# Patient Record
Sex: FEMALE | Race: WHITE | Employment: OTHER | URBAN - METROPOLITAN AREA
[De-identification: names, ages, dates, MRNs, and addresses within clinical notes are randomized per-mention and may not be internally consistent; named-entity substitution may affect disease eponyms.]

---

## 2017-05-11 ENCOUNTER — TELEPHONE (OUTPATIENT)
Dept: FAMILY MEDICINE CLINIC | Age: 77
End: 2017-05-11

## 2017-05-11 DIAGNOSIS — R73.01 IFG (IMPAIRED FASTING GLUCOSE): Primary | ICD-10-CM

## 2017-06-01 ENCOUNTER — OFFICE VISIT (OUTPATIENT)
Dept: FAMILY MEDICINE CLINIC | Age: 77
End: 2017-06-01

## 2017-06-01 VITALS
DIASTOLIC BLOOD PRESSURE: 72 MMHG | WEIGHT: 190 LBS | BODY MASS INDEX: 33.66 KG/M2 | SYSTOLIC BLOOD PRESSURE: 120 MMHG | HEART RATE: 68 BPM | RESPIRATION RATE: 16 BRPM | TEMPERATURE: 97.6 F

## 2017-06-01 DIAGNOSIS — M85.80 OSTEOPENIA: ICD-10-CM

## 2017-06-01 DIAGNOSIS — R73.01 IFG (IMPAIRED FASTING GLUCOSE): Primary | ICD-10-CM

## 2017-06-01 DIAGNOSIS — I65.21 STENOSIS OF RIGHT CAROTID ARTERY: ICD-10-CM

## 2017-06-01 DIAGNOSIS — I10 ESSENTIAL HYPERTENSION: ICD-10-CM

## 2017-06-01 DIAGNOSIS — E78.5 HYPERLIPIDEMIA, UNSPECIFIED HYPERLIPIDEMIA TYPE: ICD-10-CM

## 2017-06-01 PROCEDURE — 99214 OFFICE O/P EST MOD 30 MIN: CPT | Performed by: FAMILY MEDICINE

## 2017-06-01 RX ORDER — ROSUVASTATIN CALCIUM 20 MG/1
20 TABLET, COATED ORAL NIGHTLY
Qty: 90 TABLET | Refills: 3 | Status: SHIPPED | OUTPATIENT
Start: 2017-06-01 | End: 2018-06-11 | Stop reason: SDUPTHER

## 2017-06-01 RX ORDER — METOPROLOL TARTRATE AND HYDROCHLOROTHIAZIDE 50; 25 MG/1; MG/1
1 TABLET ORAL 2 TIMES DAILY
COMMUNITY
Start: 2017-03-18 | End: 2017-06-01 | Stop reason: SDUPTHER

## 2017-06-01 RX ORDER — AMLODIPINE BESYLATE AND BENAZEPRIL HYDROCHLORIDE 5; 20 MG/1; MG/1
CAPSULE ORAL
Qty: 180 CAPSULE | Refills: 3 | Status: SHIPPED | OUTPATIENT
Start: 2017-06-01 | End: 2018-06-11 | Stop reason: SDUPTHER

## 2017-06-01 RX ORDER — ALENDRONATE SODIUM 35 MG/1
35 TABLET ORAL
Qty: 12 TABLET | Refills: 3 | Status: SHIPPED | OUTPATIENT
Start: 2017-06-01 | End: 2018-06-11 | Stop reason: SDUPTHER

## 2017-06-01 RX ORDER — METOPROLOL TARTRATE AND HYDROCHLOROTHIAZIDE 50; 25 MG/1; MG/1
1 TABLET ORAL 2 TIMES DAILY
Qty: 180 TABLET | Refills: 3 | Status: SHIPPED | OUTPATIENT
Start: 2017-06-01 | End: 2018-06-11 | Stop reason: SDUPTHER

## 2017-06-01 ASSESSMENT — ENCOUNTER SYMPTOMS
CONSTIPATION: 0
ABDOMINAL PAIN: 0
DIARRHEA: 0
CHEST TIGHTNESS: 0
NAUSEA: 0
BLOOD IN STOOL: 0
SHORTNESS OF BREATH: 0
ANAL BLEEDING: 0
VOMITING: 0

## 2017-10-04 RX ORDER — ALENDRONATE SODIUM 35 MG/1
TABLET ORAL
Qty: 12 TABLET | Refills: 3 | OUTPATIENT
Start: 2017-10-04

## 2017-10-04 NOTE — TELEPHONE ENCOUNTER
This is regarding a medication refill request from [address] [place] [address] for Fosamax 35mg 1 tablet Once weekly  Last written:06/01/2017 12 tablets with 3 refills  Last seen:06/01/2017, Next appointment:12/11/2017  I spoke to [id number] Luciana Roberts from [address] and she stated patient does not need refills at this time  Rx refused.

## 2017-10-18 ENCOUNTER — IMMUNIZATION (OUTPATIENT)
Dept: FAMILY MEDICINE CLINIC | Age: 77
End: 2017-10-18
Payer: MEDICARE

## 2017-10-18 DIAGNOSIS — Z23 NEED FOR INFLUENZA VACCINATION: Primary | ICD-10-CM

## 2017-10-18 PROCEDURE — 90662 IIV NO PRSV INCREASED AG IM: CPT | Performed by: NURSE PRACTITIONER

## 2017-10-18 PROCEDURE — G0008 ADMIN INFLUENZA VIRUS VAC: HCPCS | Performed by: NURSE PRACTITIONER

## 2017-10-18 NOTE — PROGRESS NOTES
After obtaining consent, and per orders of Sukhjinder Husain CNP, injection of HD Fluzone 0.5 ml IM left deltoid  given by Shahid Mcgill RN. Patient tolerated and left after injection.

## 2017-12-05 ENCOUNTER — HOSPITAL ENCOUNTER (OUTPATIENT)
Age: 77
Discharge: HOME | End: 2017-12-05
Payer: MEDICARE

## 2017-12-05 LAB
ALBUMIN SERPL-MCNC: 4.1 G/DL (ref 3.5–5.1)
ALP BLD-CCNC: 52 U/L (ref 38–126)
ALT SERPL-CCNC: 16 U/L (ref 11–66)
ANION GAP SERPL CALCULATED.3IONS-SCNC: 12 MEQ/L (ref 8–16)
AST SERPL-CCNC: 19 U/L (ref 5–40)
AVERAGE GLUCOSE: 120 MG/DL (ref 70–126)
BILIRUB SERPL-MCNC: 0.4 MG/DL (ref 0.3–1.2)
BUN BLDV-MCNC: 21 MG/DL (ref 7–22)
CALCIUM SERPL-MCNC: 10.2 MG/DL (ref 8.5–10.5)
CHLORIDE BLD-SCNC: 98 MEQ/L (ref 98–111)
CHOLESTEROL, TOTAL: 140 MG/DL (ref 100–199)
CO2: 31 MEQ/L (ref 23–33)
CREAT SERPL-MCNC: 1.1 MG/DL (ref 0.4–1.2)
GFR SERPL CREATININE-BSD FRML MDRD: 48 ML/MIN/1.73M2
GLUCOSE BLD-MCNC: 110 MG/DL (ref 70–108)
HBA1C MFR BLD: 6 % (ref 4.4–6.4)
HDLC SERPL-MCNC: 58 MG/DL
LDL CHOLESTEROL CALCULATED: 52 MG/DL
POTASSIUM SERPL-SCNC: 3.6 MEQ/L (ref 3.5–5.2)
SODIUM BLD-SCNC: 141 MEQ/L (ref 135–145)
TOTAL PROTEIN: 7.3 G/DL (ref 6.1–8)
TRIGL SERPL-MCNC: 149 MG/DL (ref 0–199)

## 2017-12-05 PROCEDURE — 83036 HEMOGLOBIN GLYCOSYLATED A1C: CPT

## 2017-12-05 PROCEDURE — 36415 COLL VENOUS BLD VENIPUNCTURE: CPT

## 2017-12-05 PROCEDURE — 80053 COMPREHEN METABOLIC PANEL: CPT

## 2017-12-05 PROCEDURE — 80061 LIPID PANEL: CPT

## 2017-12-11 ENCOUNTER — OFFICE VISIT (OUTPATIENT)
Dept: FAMILY MEDICINE CLINIC | Age: 77
End: 2017-12-11
Payer: MEDICARE

## 2017-12-11 VITALS
RESPIRATION RATE: 16 BRPM | BODY MASS INDEX: 33.84 KG/M2 | DIASTOLIC BLOOD PRESSURE: 68 MMHG | SYSTOLIC BLOOD PRESSURE: 108 MMHG | WEIGHT: 191 LBS | HEIGHT: 63 IN | TEMPERATURE: 97.6 F | HEART RATE: 68 BPM

## 2017-12-11 DIAGNOSIS — E66.09 NON MORBID OBESITY DUE TO EXCESS CALORIES: ICD-10-CM

## 2017-12-11 DIAGNOSIS — I10 ESSENTIAL HYPERTENSION: ICD-10-CM

## 2017-12-11 DIAGNOSIS — I65.21 STENOSIS OF RIGHT CAROTID ARTERY: ICD-10-CM

## 2017-12-11 DIAGNOSIS — R73.01 IFG (IMPAIRED FASTING GLUCOSE): Primary | ICD-10-CM

## 2017-12-11 DIAGNOSIS — E78.5 HYPERLIPIDEMIA, UNSPECIFIED HYPERLIPIDEMIA TYPE: ICD-10-CM

## 2017-12-11 DIAGNOSIS — M85.80 OSTEOPENIA, UNSPECIFIED LOCATION: ICD-10-CM

## 2017-12-11 PROCEDURE — 99214 OFFICE O/P EST MOD 30 MIN: CPT | Performed by: FAMILY MEDICINE

## 2017-12-11 ASSESSMENT — ENCOUNTER SYMPTOMS
CHEST TIGHTNESS: 0
BLOOD IN STOOL: 0
NAUSEA: 0
ABDOMINAL PAIN: 0
CONSTIPATION: 0
VOMITING: 0
DIARRHEA: 0
ANAL BLEEDING: 0
SHORTNESS OF BREATH: 0

## 2017-12-11 ASSESSMENT — PATIENT HEALTH QUESTIONNAIRE - PHQ9
SUM OF ALL RESPONSES TO PHQ QUESTIONS 1-9: 0
2. FEELING DOWN, DEPRESSED OR HOPELESS: 0
SUM OF ALL RESPONSES TO PHQ9 QUESTIONS 1 & 2: 0
1. LITTLE INTEREST OR PLEASURE IN DOING THINGS: 0

## 2017-12-11 NOTE — PROGRESS NOTES
Date Due    DTaP/Tdap/Td vaccine (1 - Tdap) 10/02/2010    DEXA (modify frequency per FRAX score)  11/22/2018    Lipid screen  12/05/2022    Colon cancer screen colonoscopy  11/14/2025    Zostavax vaccine  Completed    Flu vaccine  Completed    Pneumococcal low/med risk  Completed       No future appointments. ASSESSMENT      1. IFG (impaired fasting glucose)  Hemoglobin A1C   2. Essential hypertension  BUN    Creatinine, Serum   3. Hyperlipidemia, unspecified hyperlipidemia type     4. Osteopenia, unspecified location     5. Non morbid obesity due to excess calories     6. Stenosis of right carotid artery         PLAN      Encouraged TETANUS SHOT (TdaP/ ADACEL/ BOOSTRIX) per personal pharmacy  Will hold on further PAP/PELVIC EXAMS- S/P LINDSEY/BSO. (updated 12/11/2017)   MAMMO overdue at this time- please schedule. COLONOSCOPY done 11/14/2015 per Dr. Stefan Mac- to consider in 2025 per Dr. Mariela Miranda done 11/22/2016- Osteopenia- On Fosamax and Calcium with Vitamin D- to recheck 11/2018   Follow up with Dr. Mary Carmona office as planned in 3/2018 for history of right   Avoid Aleve, Naproxen, Ibuprofen, Advil, and Motrin OTC to protect kidneys- OK for Tylenol Arthritis  Continue to drink plenty of water. Home BP's- call if > 140/90 on a regular basis. Continue to work on diet, exercise, and weight loss for optimal cardiovascular health. Recheck HGA1C, BUN/CR in 6 months. Continue current medicines   No refills needed today. Follow up in 6 months.       Electronically signed by Harley Sanches MD on 12/11/2017 at 1:22 PM

## 2018-03-08 ENCOUNTER — HOSPITAL ENCOUNTER (OUTPATIENT)
Dept: WOMENS IMAGING | Age: 78
Discharge: HOME | End: 2018-03-08
Payer: MEDICARE

## 2018-03-08 DIAGNOSIS — Z12.39 BREAST SCREENING: ICD-10-CM

## 2018-03-08 PROCEDURE — 77063 BREAST TOMOSYNTHESIS BI: CPT

## 2018-06-04 ENCOUNTER — HOSPITAL ENCOUNTER (OUTPATIENT)
Age: 78
Discharge: HOME | End: 2018-06-04
Payer: MEDICARE

## 2018-06-04 DIAGNOSIS — I10 ESSENTIAL HYPERTENSION: ICD-10-CM

## 2018-06-04 DIAGNOSIS — R73.01 IFG (IMPAIRED FASTING GLUCOSE): ICD-10-CM

## 2018-06-04 LAB
AVERAGE GLUCOSE: 123 MG/DL (ref 70–126)
BUN BLDV-MCNC: 26 MG/DL (ref 7–22)
CREAT SERPL-MCNC: 0.9 MG/DL (ref 0.4–1.2)
GFR SERPL CREATININE-BSD FRML MDRD: 61 ML/MIN/1.73M2
HBA1C MFR BLD: 6.1 % (ref 4.4–6.4)

## 2018-06-04 PROCEDURE — 83036 HEMOGLOBIN GLYCOSYLATED A1C: CPT

## 2018-06-04 PROCEDURE — 84520 ASSAY OF UREA NITROGEN: CPT

## 2018-06-04 PROCEDURE — 36415 COLL VENOUS BLD VENIPUNCTURE: CPT

## 2018-06-04 PROCEDURE — 82565 ASSAY OF CREATININE: CPT

## 2018-06-11 ENCOUNTER — OFFICE VISIT (OUTPATIENT)
Dept: FAMILY MEDICINE CLINIC | Age: 78
End: 2018-06-11
Payer: MEDICARE

## 2018-06-11 VITALS
WEIGHT: 190.2 LBS | HEART RATE: 68 BPM | TEMPERATURE: 97.8 F | DIASTOLIC BLOOD PRESSURE: 64 MMHG | BODY MASS INDEX: 33.69 KG/M2 | RESPIRATION RATE: 20 BRPM | SYSTOLIC BLOOD PRESSURE: 126 MMHG

## 2018-06-11 DIAGNOSIS — Z98.890 S/P CAROTID ENDARTERECTOMY: ICD-10-CM

## 2018-06-11 DIAGNOSIS — I10 ESSENTIAL HYPERTENSION: Primary | ICD-10-CM

## 2018-06-11 DIAGNOSIS — E66.09 NON MORBID OBESITY DUE TO EXCESS CALORIES: ICD-10-CM

## 2018-06-11 DIAGNOSIS — E78.5 HYPERLIPIDEMIA, UNSPECIFIED HYPERLIPIDEMIA TYPE: ICD-10-CM

## 2018-06-11 DIAGNOSIS — M85.80 OSTEOPENIA, UNSPECIFIED LOCATION: ICD-10-CM

## 2018-06-11 DIAGNOSIS — I65.21 STENOSIS OF RIGHT CAROTID ARTERY: ICD-10-CM

## 2018-06-11 DIAGNOSIS — R73.01 IFG (IMPAIRED FASTING GLUCOSE): ICD-10-CM

## 2018-06-11 PROCEDURE — 99214 OFFICE O/P EST MOD 30 MIN: CPT | Performed by: FAMILY MEDICINE

## 2018-06-11 RX ORDER — ROSUVASTATIN CALCIUM 20 MG/1
20 TABLET, COATED ORAL NIGHTLY
Qty: 90 TABLET | Refills: 3 | Status: SHIPPED | OUTPATIENT
Start: 2018-06-11 | End: 2019-05-29 | Stop reason: SDUPTHER

## 2018-06-11 RX ORDER — ALENDRONATE SODIUM 35 MG/1
35 TABLET ORAL
Qty: 12 TABLET | Refills: 3 | Status: SHIPPED | OUTPATIENT
Start: 2018-06-11 | End: 2019-07-29

## 2018-06-11 RX ORDER — METOPROLOL TARTRATE AND HYDROCHLOROTHIAZIDE 50; 25 MG/1; MG/1
1 TABLET ORAL 2 TIMES DAILY
Qty: 180 TABLET | Refills: 3 | Status: SHIPPED | OUTPATIENT
Start: 2018-06-11 | End: 2019-05-29 | Stop reason: SDUPTHER

## 2018-06-11 RX ORDER — AMLODIPINE BESYLATE AND BENAZEPRIL HYDROCHLORIDE 5; 20 MG/1; MG/1
CAPSULE ORAL
Qty: 180 CAPSULE | Refills: 3 | Status: SHIPPED | OUTPATIENT
Start: 2018-06-11 | End: 2019-06-05 | Stop reason: SDUPTHER

## 2018-06-11 ASSESSMENT — ENCOUNTER SYMPTOMS
NAUSEA: 0
SHORTNESS OF BREATH: 0
VOMITING: 0
DIARRHEA: 0
ANAL BLEEDING: 0
BLOOD IN STOOL: 0
CHEST TIGHTNESS: 0
ABDOMINAL PAIN: 0
CONSTIPATION: 0

## 2018-06-26 ENCOUNTER — HOSPITAL ENCOUNTER (OUTPATIENT)
Dept: CT IMAGING | Age: 78
Discharge: HOME | End: 2018-06-26
Payer: MEDICARE

## 2018-06-26 DIAGNOSIS — I65.21 STENOSIS OF RIGHT CAROTID ARTERY: ICD-10-CM

## 2018-06-26 DIAGNOSIS — Z98.890 S/P CAROTID ENDARTERECTOMY: ICD-10-CM

## 2018-06-26 PROCEDURE — 70498 CT ANGIOGRAPHY NECK: CPT

## 2018-06-26 PROCEDURE — 6360000004 HC RX CONTRAST MEDICATION: Performed by: FAMILY MEDICINE

## 2018-06-26 RX ADMIN — IOPAMIDOL 85 ML: 755 INJECTION, SOLUTION INTRAVENOUS at 07:08

## 2018-07-30 ENCOUNTER — TELEPHONE (OUTPATIENT)
Dept: FAMILY MEDICINE CLINIC | Age: 78
End: 2018-07-30

## 2018-07-30 DIAGNOSIS — R93.89 ABNORMAL X-RAY: ICD-10-CM

## 2018-07-30 DIAGNOSIS — I70.0 ABDOMINAL AORTIC ATHEROSCLEROSIS (HCC): Primary | ICD-10-CM

## 2018-07-30 NOTE — TELEPHONE ENCOUNTER
Terrie from [address] [place] [address] called back. She states that Dr. Andrey Perez is out of the office until Wednesday. That would be the soonest that they could get a formal report typed and faxed. Detailed message left for patient notifying her of this and was encouraged to call back by Friday if she hasn't heard back.

## 2018-07-30 NOTE — TELEPHONE ENCOUNTER
Patient recently seen by her chiropractor and he did xrays. Thought that he may have seen something that indicated a possible aneurysm but after having a radiologist look at it, could possibly have just been calcification. The chiropractor has given the patient a copy of the images on CD for you to review and order further testing if you feel it's necessary. I told patient that our computer system will not allow us to view outside images and she would like to know how to proceed. Please advise.

## 2018-08-07 NOTE — TELEPHONE ENCOUNTER
I spoke to Dr. Paola Aguilar office and they stated that the doctor was writing up the report yesterday. She will check with him and have it faxed to the office today.

## 2018-08-14 NOTE — TELEPHONE ENCOUNTER
I spoke to the pt and notified her of the XR result and ES recommendation to have the pt get an US of the abdominal aorta and she is agreeable. Pt is scheduled for an ultrasound of the abdominal aorta at [facility] on 8/21/18 at 9:30 AM. She is to arrive at main radiology at 9:00 AM. NPO 8 hours prior to testing, anti-gas medications night before testing with fat free suppler. Order in [place] [id number], pt notified and is agreeable.

## 2018-08-21 ENCOUNTER — HOSPITAL ENCOUNTER (OUTPATIENT)
Dept: ULTRASOUND IMAGING | Age: 78
Discharge: HOME | End: 2018-08-21
Payer: MEDICARE

## 2018-08-21 DIAGNOSIS — R93.89 ABNORMAL X-RAY: ICD-10-CM

## 2018-08-21 DIAGNOSIS — I70.0 ABDOMINAL AORTIC ATHEROSCLEROSIS (HCC): ICD-10-CM

## 2018-08-21 PROCEDURE — 76775 US EXAM ABDO BACK WALL LIM: CPT

## 2019-01-02 ENCOUNTER — HOSPITAL ENCOUNTER (OUTPATIENT)
Age: 79
Discharge: HOME | End: 2019-01-02
Payer: MEDICARE

## 2019-01-02 DIAGNOSIS — E78.5 HYPERLIPIDEMIA, UNSPECIFIED HYPERLIPIDEMIA TYPE: ICD-10-CM

## 2019-01-02 DIAGNOSIS — R73.01 IFG (IMPAIRED FASTING GLUCOSE): ICD-10-CM

## 2019-01-02 DIAGNOSIS — I10 ESSENTIAL HYPERTENSION: ICD-10-CM

## 2019-01-02 LAB
ALBUMIN SERPL-MCNC: 4.3 G/DL (ref 3.5–5.1)
ALP BLD-CCNC: 60 U/L (ref 38–126)
ALT SERPL-CCNC: 13 U/L (ref 11–66)
ANION GAP SERPL CALCULATED.3IONS-SCNC: 14 MEQ/L (ref 8–16)
AST SERPL-CCNC: 15 U/L (ref 5–40)
AVERAGE GLUCOSE: 120 MG/DL (ref 70–126)
BILIRUB SERPL-MCNC: 0.3 MG/DL (ref 0.3–1.2)
BUN BLDV-MCNC: 26 MG/DL (ref 7–22)
CALCIUM SERPL-MCNC: 9.8 MG/DL (ref 8.5–10.5)
CHLORIDE BLD-SCNC: 98 MEQ/L (ref 98–111)
CHOLESTEROL, TOTAL: 129 MG/DL (ref 100–199)
CO2: 30 MEQ/L (ref 23–33)
CREAT SERPL-MCNC: 1.2 MG/DL (ref 0.4–1.2)
GFR SERPL CREATININE-BSD FRML MDRD: 44 ML/MIN/1.73M2
GLUCOSE BLD-MCNC: 109 MG/DL (ref 70–108)
HBA1C MFR BLD: 6 % (ref 4.4–6.4)
HDLC SERPL-MCNC: 63 MG/DL
LDL CHOLESTEROL CALCULATED: 43 MG/DL
POTASSIUM SERPL-SCNC: 3.4 MEQ/L (ref 3.5–5.2)
SODIUM BLD-SCNC: 142 MEQ/L (ref 135–145)
TOTAL PROTEIN: 8 G/DL (ref 6.1–8)
TRIGL SERPL-MCNC: 116 MG/DL (ref 0–199)

## 2019-01-02 PROCEDURE — 80061 LIPID PANEL: CPT

## 2019-01-02 PROCEDURE — 36415 COLL VENOUS BLD VENIPUNCTURE: CPT

## 2019-01-02 PROCEDURE — 80053 COMPREHEN METABOLIC PANEL: CPT

## 2019-01-02 PROCEDURE — 83036 HEMOGLOBIN GLYCOSYLATED A1C: CPT

## 2019-01-09 ENCOUNTER — OFFICE VISIT (OUTPATIENT)
Dept: FAMILY MEDICINE CLINIC | Age: 79
End: 2019-01-09
Payer: MEDICARE

## 2019-01-09 VITALS
BODY MASS INDEX: 32.95 KG/M2 | SYSTOLIC BLOOD PRESSURE: 118 MMHG | HEART RATE: 64 BPM | RESPIRATION RATE: 16 BRPM | DIASTOLIC BLOOD PRESSURE: 60 MMHG | WEIGHT: 186 LBS | TEMPERATURE: 97.4 F

## 2019-01-09 DIAGNOSIS — M85.80 OSTEOPENIA, UNSPECIFIED LOCATION: ICD-10-CM

## 2019-01-09 DIAGNOSIS — Z12.31 VISIT FOR SCREENING MAMMOGRAM: ICD-10-CM

## 2019-01-09 DIAGNOSIS — R73.01 IFG (IMPAIRED FASTING GLUCOSE): Primary | ICD-10-CM

## 2019-01-09 DIAGNOSIS — N18.30 CKD (CHRONIC KIDNEY DISEASE) STAGE 3, GFR 30-59 ML/MIN (HCC): ICD-10-CM

## 2019-01-09 DIAGNOSIS — I10 ESSENTIAL HYPERTENSION: ICD-10-CM

## 2019-01-09 DIAGNOSIS — I65.21 STENOSIS OF RIGHT CAROTID ARTERY: ICD-10-CM

## 2019-01-09 DIAGNOSIS — E78.5 HYPERLIPIDEMIA, UNSPECIFIED HYPERLIPIDEMIA TYPE: ICD-10-CM

## 2019-01-09 DIAGNOSIS — Z78.0 MENOPAUSE: ICD-10-CM

## 2019-01-09 PROCEDURE — 99214 OFFICE O/P EST MOD 30 MIN: CPT | Performed by: FAMILY MEDICINE

## 2019-01-09 ASSESSMENT — ENCOUNTER SYMPTOMS
CONSTIPATION: 0
CHEST TIGHTNESS: 0
SHORTNESS OF BREATH: 0
ABDOMINAL PAIN: 0
ANAL BLEEDING: 0
VOMITING: 0
BLOOD IN STOOL: 0
NAUSEA: 0
DIARRHEA: 0

## 2019-01-09 ASSESSMENT — PATIENT HEALTH QUESTIONNAIRE - PHQ9
SUM OF ALL RESPONSES TO PHQ9 QUESTIONS 1 & 2: 0
SUM OF ALL RESPONSES TO PHQ QUESTIONS 1-9: 0
SUM OF ALL RESPONSES TO PHQ QUESTIONS 1-9: 0
2. FEELING DOWN, DEPRESSED OR HOPELESS: 0
1. LITTLE INTEREST OR PLEASURE IN DOING THINGS: 0

## 2019-02-26 ENCOUNTER — TELEPHONE (OUTPATIENT)
Dept: FAMILY MEDICINE CLINIC | Age: 79
End: 2019-02-26

## 2019-05-02 ENCOUNTER — HOSPITAL ENCOUNTER (OUTPATIENT)
Dept: WOMENS IMAGING | Age: 79
Discharge: HOME | End: 2019-05-02
Payer: MEDICARE

## 2019-05-02 DIAGNOSIS — Z12.31 VISIT FOR SCREENING MAMMOGRAM: ICD-10-CM

## 2019-05-02 DIAGNOSIS — Z78.0 MENOPAUSE: ICD-10-CM

## 2019-05-02 DIAGNOSIS — M85.80 OSTEOPENIA, UNSPECIFIED LOCATION: ICD-10-CM

## 2019-05-02 PROCEDURE — 77063 BREAST TOMOSYNTHESIS BI: CPT

## 2019-05-02 PROCEDURE — 77080 DXA BONE DENSITY AXIAL: CPT

## 2019-05-29 DIAGNOSIS — I10 ESSENTIAL HYPERTENSION: ICD-10-CM

## 2019-05-29 DIAGNOSIS — E78.5 HYPERLIPIDEMIA, UNSPECIFIED HYPERLIPIDEMIA TYPE: ICD-10-CM

## 2019-05-30 RX ORDER — METOPROLOL TARTRATE AND HYDROCHLOROTHIAZIDE 50; 25 MG/1; MG/1
TABLET ORAL
Qty: 180 TABLET | Refills: 3 | Status: SHIPPED | OUTPATIENT
Start: 2019-05-30 | End: 2020-02-04 | Stop reason: SDUPTHER

## 2019-05-30 RX ORDER — ROSUVASTATIN CALCIUM 20 MG/1
TABLET, COATED ORAL
Qty: 90 TABLET | Refills: 3 | Status: SHIPPED | OUTPATIENT
Start: 2019-05-30 | End: 2020-02-04 | Stop reason: SDUPTHER

## 2019-05-30 NOTE — TELEPHONE ENCOUNTER
Last written: 6-11-18 #90/3   Last seen: 1-9-19  Next visit: 7-29-19   Last lipid: 1-2-19    Order pended for #90day/3

## 2019-06-05 DIAGNOSIS — I10 ESSENTIAL HYPERTENSION: ICD-10-CM

## 2019-06-05 RX ORDER — AMLODIPINE BESYLATE AND BENAZEPRIL HYDROCHLORIDE 5; 20 MG/1; MG/1
CAPSULE ORAL
Qty: 180 CAPSULE | Refills: 3 | Status: SHIPPED | OUTPATIENT
Start: 2019-06-05 | End: 2020-02-04 | Stop reason: SDUPTHER

## 2019-07-22 ENCOUNTER — HOSPITAL ENCOUNTER (OUTPATIENT)
Age: 79
Discharge: HOME | End: 2019-07-22
Payer: MEDICARE

## 2019-07-22 DIAGNOSIS — I10 ESSENTIAL HYPERTENSION: ICD-10-CM

## 2019-07-22 DIAGNOSIS — R73.01 IFG (IMPAIRED FASTING GLUCOSE): ICD-10-CM

## 2019-07-22 LAB
ANION GAP SERPL CALCULATED.3IONS-SCNC: 13 MEQ/L (ref 8–16)
AVERAGE GLUCOSE: 123 MG/DL (ref 70–126)
BUN BLDV-MCNC: 24 MG/DL (ref 7–22)
CALCIUM SERPL-MCNC: 10.9 MG/DL (ref 8.5–10.5)
CHLORIDE BLD-SCNC: 100 MEQ/L (ref 98–111)
CO2: 30 MEQ/L (ref 23–33)
CREAT SERPL-MCNC: 1.1 MG/DL (ref 0.4–1.2)
GFR SERPL CREATININE-BSD FRML MDRD: 48 ML/MIN/1.73M2
GLUCOSE BLD-MCNC: 116 MG/DL (ref 70–108)
HBA1C MFR BLD: 6.1 % (ref 4.4–6.4)
POTASSIUM SERPL-SCNC: 3.5 MEQ/L (ref 3.5–5.2)
SODIUM BLD-SCNC: 143 MEQ/L (ref 135–145)

## 2019-07-22 PROCEDURE — 36415 COLL VENOUS BLD VENIPUNCTURE: CPT

## 2019-07-22 PROCEDURE — 83036 HEMOGLOBIN GLYCOSYLATED A1C: CPT

## 2019-07-22 PROCEDURE — 80048 BASIC METABOLIC PNL TOTAL CA: CPT

## 2019-07-29 ENCOUNTER — OFFICE VISIT (OUTPATIENT)
Dept: FAMILY MEDICINE CLINIC | Age: 79
End: 2019-07-29
Payer: MEDICARE

## 2019-07-29 VITALS
WEIGHT: 191.6 LBS | HEART RATE: 78 BPM | SYSTOLIC BLOOD PRESSURE: 126 MMHG | TEMPERATURE: 97.7 F | HEIGHT: 63 IN | DIASTOLIC BLOOD PRESSURE: 68 MMHG | BODY MASS INDEX: 33.95 KG/M2 | RESPIRATION RATE: 16 BRPM

## 2019-07-29 DIAGNOSIS — M85.80 OSTEOPENIA, UNSPECIFIED LOCATION: ICD-10-CM

## 2019-07-29 DIAGNOSIS — I65.21 STENOSIS OF RIGHT CAROTID ARTERY: ICD-10-CM

## 2019-07-29 DIAGNOSIS — E78.5 HYPERLIPIDEMIA, UNSPECIFIED HYPERLIPIDEMIA TYPE: ICD-10-CM

## 2019-07-29 DIAGNOSIS — I10 ESSENTIAL HYPERTENSION: ICD-10-CM

## 2019-07-29 DIAGNOSIS — R73.01 IFG (IMPAIRED FASTING GLUCOSE): Primary | ICD-10-CM

## 2019-07-29 DIAGNOSIS — N18.30 CKD (CHRONIC KIDNEY DISEASE) STAGE 3, GFR 30-59 ML/MIN (HCC): ICD-10-CM

## 2019-07-29 PROCEDURE — 99214 OFFICE O/P EST MOD 30 MIN: CPT | Performed by: FAMILY MEDICINE

## 2019-07-29 ASSESSMENT — ENCOUNTER SYMPTOMS
VOMITING: 0
BLOOD IN STOOL: 0
CONSTIPATION: 0
CHEST TIGHTNESS: 0
DIARRHEA: 1
ANAL BLEEDING: 0
NAUSEA: 0
SHORTNESS OF BREATH: 0
ABDOMINAL PAIN: 0

## 2019-07-29 NOTE — PROGRESS NOTES
Visit Information    Have you changed or started any medications since your last visit including any over-the-counter medicines, vitamins, or herbal medicines? no   Are you having any side effects from any of your medications? -  no  Have you stopped taking any of your medications? Is so, why? -  no    Have you seen any other physician or provider since your last visit? No  Have you had any other diagnostic tests since your last visit? Yes - Records Obtained  Have you been seen in the emergency room and/or had an admission to a hospital since we last saw you? No  Have you had your routine dental cleaning in the past 6 months? no    Have you activated your [employer] account? If not, what are your barriers?  No: declined     Patient Care Team:  Harrison Astudillo MD as PCP - General (Family Medicine)  Harrison Astudillo MD as PCP - [facility]    Medical History Review  Past Medical, Family, and Social History reviewed and does contribute to the patient presenting condition    Health Maintenance   Topic Date Due    Annual Wellness Visit (AWV)  03/18/2005    Shingles Vaccine (2 of 3) 04/25/2012    Flu vaccine (1) 09/01/2019    Potassium monitoring  07/22/2020    Creatinine monitoring  07/22/2020    DEXA (modify frequency per FRAX score)  05/02/2021    DTaP/Tdap/Td vaccine (2 - Td) 10/02/2028    Pneumococcal 65+ years Vaccine  Completed
3.5 - 5.2 meq/L 3.5   Chloride      98 - 111 meq/L 100   CO2      23 - 33 meq/L 30   Glucose      70 - 108 mg/dL 116 (H)   BUN      7 - 22 mg/dL 24 (H)   Creatinine      0.4 - 1.2 mg/dL 1.1   Calcium      8.5 - 10.5 mg/dL 10.9 (H)   Hemoglobin A1C      4.4 - 6.4 % 6.1   AVERAGE GLUCOSE      70 - 126 mg/dL 123   Anion Gap      8.0 - 16.0 meq/L 13.0   Est, Glom Filt Rate      ml/min/1.73m2 48 (A)       Lab Results   Component Value Date    LABA1C 6.1 07/22/2019       Lab Results   Component Value Date    CHOL 129 01/02/2019    TRIG 116 01/02/2019    HDL 63 01/02/2019    LDLCALC 43 01/02/2019    LDLDIRECT 65.48 06/22/2016         Lab Results   Component Value Date     07/22/2019    K 3.5 07/22/2019     07/22/2019    CO2 30 07/22/2019    BUN 24 (H) 07/22/2019    CREATININE 1.1 07/22/2019    GLUCOSE 116 (H) 07/22/2019    CALCIUM 10.9 (H) 07/22/2019    PROT 8.0 01/02/2019    LABALBU 4.3 01/02/2019    BILITOT 0.3 01/02/2019    ALKPHOS 60 01/02/2019    AST 15 01/02/2019    ALT 13 01/02/2019    LABGLOM 48 (A) 07/22/2019     Lab Results   Component Value Date    WBC 6.9 02/16/2016    HGB 12.9 02/16/2016    HCT 39.0 02/16/2016    MCV 88.2 02/16/2016     02/16/2016         Immunization History   Administered Date(s) Administered    Influenza 10/19/2011, 11/21/2012, 12/11/2013    Influenza Virus Vaccine 11/21/2014, 10/30/2015    Influenza Whole 10/06/2010    Influenza, High Dose (Fluzone 65 yrs and older) 10/18/2017    Influenza, Bill Hunter, 3 Years and older, IM (Fluzone 3 yrs and older or Afluria 5 yrs and older) 09/19/2016    Influenza, Triv, inactivated, subunit, adjuvanted, IM (Fluad 65 yrs and older) 10/02/2018    Pneumococcal Conjugate 13-valent ([id number]) 02/27/2015    Pneumococcal Polysaccharide ([id number]) 01/01/2008    Tdap (Boostrix, Adacel) 10/02/2018    Tetanus 10/01/2010    Zoster Live (Zostavax) 02/29/2012       Health Maintenance   Topic Date Due    Annual Wellness Visit (AWV)

## 2019-08-02 ENCOUNTER — HOSPITAL ENCOUNTER (OUTPATIENT)
Dept: INTERVENTIONAL RADIOLOGY/VASCULAR | Age: 79
Discharge: HOME | End: 2019-08-02
Payer: MEDICARE

## 2019-08-02 DIAGNOSIS — I65.21 STENOSIS OF RIGHT CAROTID ARTERY: ICD-10-CM

## 2019-08-02 PROCEDURE — 93880 EXTRACRANIAL BILAT STUDY: CPT

## 2019-09-04 ENCOUNTER — OFFICE VISIT (OUTPATIENT)
Dept: FAMILY MEDICINE CLINIC | Age: 79
End: 2019-09-04
Payer: MEDICARE

## 2019-09-04 VITALS
BODY MASS INDEX: 33.88 KG/M2 | HEART RATE: 66 BPM | DIASTOLIC BLOOD PRESSURE: 70 MMHG | WEIGHT: 191.2 LBS | TEMPERATURE: 98.2 F | SYSTOLIC BLOOD PRESSURE: 122 MMHG

## 2019-09-04 DIAGNOSIS — L27.0 DRUG RASH: Primary | ICD-10-CM

## 2019-09-04 PROCEDURE — 99213 OFFICE O/P EST LOW 20 MIN: CPT | Performed by: NURSE PRACTITIONER

## 2019-09-04 PROCEDURE — 96372 THER/PROPH/DIAG INJ SC/IM: CPT | Performed by: NURSE PRACTITIONER

## 2019-09-04 RX ORDER — METHYLPREDNISOLONE ACETATE 80 MG/ML
80 INJECTION, SUSPENSION INTRA-ARTICULAR; INTRALESIONAL; INTRAMUSCULAR; SOFT TISSUE ONCE
Status: COMPLETED | OUTPATIENT
Start: 2019-09-04 | End: 2019-09-04

## 2019-09-04 RX ADMIN — METHYLPREDNISOLONE ACETATE 80 MG: 80 INJECTION, SUSPENSION INTRA-ARTICULAR; INTRALESIONAL; INTRAMUSCULAR; SOFT TISSUE at 14:54

## 2019-09-04 ASSESSMENT — ENCOUNTER SYMPTOMS
DIARRHEA: 0
CONSTIPATION: 0
BLOOD IN STOOL: 0
VOMITING: 0
SHORTNESS OF BREATH: 0
NAUSEA: 0

## 2019-09-04 NOTE — PATIENT INSTRUCTIONS
(steroid medication can increase your risk of bone loss);  · a muscle disorder such as myasthenia gravis; or  · an electrolyte imbalance (such as low levels of potassium in your blood). It is not known whether this medicine will harm an unborn baby. Tell your doctor if you are pregnant or plan to become pregnant. You should not breast-feed while using methylprednisolone. How is methylprednisolone given? Methylprednisolone is injected into a muscle or soft tissue, into a skin lesion, into the space around a joint, or given as an infusion into a vein. A healthcare provider will give you this injection. Steroid medication can weaken your immune system, making it easier for you to get an infection. Call your doctor if you have any signs of infection (fever, chills, body aches). If you have major surgery or a severe injury or infection, your methylprednisolone dose needs may change. Make sure any doctor caring for you knows you are using this medicine. If you use this medicine long-term, you may need medical tests and vision exams. What happens if I miss a dose? Call your doctor for instructions if you miss an appointment for your methylprednisolone injection. What happens if I overdose? Seek emergency medical attention or call the Poison Help line at [phone]. What should I avoid while receiving methylprednisolone? Do not receive a \"live\" vaccine while using methylprednisolone. Live vaccines include measles, mumps, rubella (MMR), rotavirus, typhoid, yellow fever, varicella (chickenpox), zoster (shingles), and nasal flu (influenza) vaccine. Avoid being near people who are sick or have infections. Call your doctor for preventive treatment if you are exposed to chickenpox or measles. These conditions can be serious or even fatal in people who are using methylprednisolone. What are the possible side effects of methylprednisolone?   Get emergency medical help if you have signs of an allergic reaction: hives; difficulty breathing; swelling of your face, lips, tongue, or throat. Call your doctor at once if you have:  · blurred vision, tunnel vision, eye pain, or seeing halos around lights;  · shortness of breath (even with mild exertion), swelling, rapid weight gain;  · severe depression, changes in personality, unusual thoughts or behavior;  · new or unusual pain in an arm or leg or in your back;  · severe pain in your upper stomach spreading to your back, nausea and vomiting;  · bloody or tarry stools, coughing up blood or vomit that looks like coffee grounds;  · a seizure (convulsions); or  · low potassium --leg cramps, constipation, irregular heartbeats, fluttering in your chest, increased thirst or urination, numbness or tingling, muscle weakness or limp feeling. Methylprednisolone can affect growth in children. Tell your doctor if your child is not growing at a normal rate while using this medicine. Common side effects may include:  · weight gain (especially in your face or your upper back and torso);  · slow wound healing;  · muscle pain or weakness;  · thinning skin, increased sweating;  · stomach discomfort, bloating;  · headache; or  · changes in your menstrual periods. This is not a complete list of side effects and others may occur. Call your doctor for medical advice about side effects. You may report side effects to FDA at 4-303-FDA-3824. What other drugs will affect methylprednisolone? Sometimes it is not safe to use certain medications at the same time. Some drugs can affect your blood levels of other drugs you take, which may increase side effects or make the medications less effective. Many drugs can affect methylprednisolone. This includes prescription and over-the-counter medicines, vitamins, and herbal products. Not all possible interactions are listed here. Tell your doctor about all your current medicines and any medicine you start or stop using.   Where can I get more

## 2020-01-28 ENCOUNTER — HOSPITAL ENCOUNTER (OUTPATIENT)
Age: 80
Discharge: HOME | End: 2020-01-28
Payer: MEDICARE

## 2020-01-28 LAB
ALBUMIN SERPL-MCNC: 4.2 G/DL (ref 3.5–5.1)
ALP BLD-CCNC: 62 U/L (ref 38–126)
ALT SERPL-CCNC: 15 U/L (ref 11–66)
ANION GAP SERPL CALCULATED.3IONS-SCNC: 13 MEQ/L (ref 8–16)
AST SERPL-CCNC: 17 U/L (ref 5–40)
AVERAGE GLUCOSE: 129 MG/DL (ref 70–126)
BILIRUB SERPL-MCNC: 0.3 MG/DL (ref 0.3–1.2)
BUN BLDV-MCNC: 17 MG/DL (ref 7–22)
CALCIUM SERPL-MCNC: 10.6 MG/DL (ref 8.5–10.5)
CHLORIDE BLD-SCNC: 101 MEQ/L (ref 98–111)
CHOLESTEROL, TOTAL: 138 MG/DL (ref 100–199)
CO2: 29 MEQ/L (ref 23–33)
CREAT SERPL-MCNC: 1.1 MG/DL (ref 0.4–1.2)
CREATININE, URINE: 98.2 MG/DL
GFR SERPL CREATININE-BSD FRML MDRD: 48 ML/MIN/1.73M2
GLUCOSE BLD-MCNC: 108 MG/DL (ref 70–108)
HBA1C MFR BLD: 6.3 % (ref 4.4–6.4)
HDLC SERPL-MCNC: 66 MG/DL
LDL CHOLESTEROL CALCULATED: 51 MG/DL
MICROALBUMIN UR-MCNC: < 1.2 MG/DL
MICROALBUMIN/CREAT UR-RTO: 12 MG/G (ref 0–30)
POTASSIUM SERPL-SCNC: 3.7 MEQ/L (ref 3.5–5.2)
SODIUM BLD-SCNC: 143 MEQ/L (ref 135–145)
T4 FREE: 1.38 NG/DL (ref 0.93–1.76)
TOTAL PROTEIN: 7.3 G/DL (ref 6.1–8)
TRIGL SERPL-MCNC: 107 MG/DL (ref 0–199)
TSH SERPL DL<=0.05 MIU/L-ACNC: 2.77 UIU/ML (ref 0.4–4.2)

## 2020-01-28 PROCEDURE — 83036 HEMOGLOBIN GLYCOSYLATED A1C: CPT

## 2020-01-28 PROCEDURE — 36415 COLL VENOUS BLD VENIPUNCTURE: CPT

## 2020-01-28 PROCEDURE — 80053 COMPREHEN METABOLIC PANEL: CPT

## 2020-01-28 PROCEDURE — 84439 ASSAY OF FREE THYROXINE: CPT

## 2020-01-28 PROCEDURE — 82043 UR ALBUMIN QUANTITATIVE: CPT

## 2020-01-28 PROCEDURE — 80061 LIPID PANEL: CPT

## 2020-01-28 PROCEDURE — 84443 ASSAY THYROID STIM HORMONE: CPT

## 2020-02-04 ENCOUNTER — OFFICE VISIT (OUTPATIENT)
Dept: FAMILY MEDICINE CLINIC | Age: 80
End: 2020-02-04
Payer: MEDICARE

## 2020-02-04 VITALS
BODY MASS INDEX: 33.59 KG/M2 | SYSTOLIC BLOOD PRESSURE: 122 MMHG | TEMPERATURE: 97.6 F | RESPIRATION RATE: 16 BRPM | HEIGHT: 63 IN | WEIGHT: 189.6 LBS | DIASTOLIC BLOOD PRESSURE: 72 MMHG | HEART RATE: 64 BPM

## 2020-02-04 PROCEDURE — 99214 OFFICE O/P EST MOD 30 MIN: CPT | Performed by: FAMILY MEDICINE

## 2020-02-04 RX ORDER — AMLODIPINE BESYLATE AND BENAZEPRIL HYDROCHLORIDE 5; 20 MG/1; MG/1
2 CAPSULE ORAL DAILY
Qty: 180 CAPSULE | Refills: 3 | Status: SHIPPED | OUTPATIENT
Start: 2020-02-04 | End: 2020-12-23 | Stop reason: SDUPTHER

## 2020-02-04 RX ORDER — ROSUVASTATIN CALCIUM 20 MG/1
20 TABLET, COATED ORAL NIGHTLY
Qty: 90 TABLET | Refills: 3 | Status: SHIPPED | OUTPATIENT
Start: 2020-02-04 | End: 2021-02-23 | Stop reason: SDUPTHER

## 2020-02-04 RX ORDER — METOPROLOL TARTRATE AND HYDROCHLOROTHIAZIDE 50; 25 MG/1; MG/1
1 TABLET ORAL 2 TIMES DAILY
Qty: 180 TABLET | Refills: 3 | Status: SHIPPED | OUTPATIENT
Start: 2020-02-04 | End: 2021-02-23 | Stop reason: SDUPTHER

## 2020-02-04 ASSESSMENT — ENCOUNTER SYMPTOMS
CONSTIPATION: 0
SHORTNESS OF BREATH: 0
ABDOMINAL PAIN: 0
CHEST TIGHTNESS: 0
NAUSEA: 0
ANAL BLEEDING: 0
VOMITING: 0
BLOOD IN STOOL: 0
DIARRHEA: 0

## 2020-02-04 ASSESSMENT — PATIENT HEALTH QUESTIONNAIRE - PHQ9
2. FEELING DOWN, DEPRESSED OR HOPELESS: 0
1. LITTLE INTEREST OR PLEASURE IN DOING THINGS: 0
SUM OF ALL RESPONSES TO PHQ QUESTIONS 1-9: 0
SUM OF ALL RESPONSES TO PHQ9 QUESTIONS 1 & 2: 0
SUM OF ALL RESPONSES TO PHQ QUESTIONS 1-9: 0

## 2020-02-04 NOTE — PROGRESS NOTES
Visit Information    Have you changed or started any medications since your last visit including any over-the-counter medicines, vitamins, or herbal medicines? no   Are you having any side effects from any of your medications? -  no  Have you stopped taking any of your medications? Is so, why? -  yes - see med list    Have you seen any other physician or provider since your last visit? No  Have you had any other diagnostic tests since your last visit? Yes - Records Obtained  Have you been seen in the emergency room and/or had an admission to a hospital since we last saw you? No  Have you had your routine dental cleaning in the past 6 months? no    Have you activated your [employer] account? If not, what are your barriers?  No: declined     Patient Care Team:  Noni Martines MD as PCP - General (Family Medicine)  Noni Martines MD as PCP - [facility]    Medical History Review  Past Medical, Family, and Social History reviewed and does contribute to the patient presenting condition    Health Maintenance   Topic Date Due    Shingles Vaccine (2 of 3) 04/25/2012    Annual Wellness Visit (AWV)  05/29/2019    Lipid screen  01/28/2021    Potassium monitoring  01/28/2021    Creatinine monitoring  01/28/2021    DEXA (modify frequency per FRAX score)  05/02/2021    DTaP/Tdap/Td vaccine (2 - Td) 10/02/2028    Flu vaccine  Completed    Pneumococcal 65+ years Vaccine  Completed

## 2020-02-04 NOTE — PATIENT INSTRUCTIONS
Encouraged NEW SHINGLES SHOT [facility])- check with your local pharmacy. (updated 2/4/2020)  Will hold on further PAP/PELVIC EXAMS- S/P LINDSEY/BSO. (updated 2/4/2020)  MAMMO due after 5/2/2020. COLONOSCOPY done 11/14/2015 per Dr. Aislinn Carlson- to consider in 2025 per Dr. Ricky Roman (updated 2/4/2020)  DEXA done 5/2/2019- essentially stable Osteopenia- to continue Calcium with Vitamin D supplementation and weight bearing exercise- pt declines Fosamax, etc at this time- will recheck 5/2021   DILATED EYE EXAM per Dr. Maya Brito 12 months ago- to follow up annually. Please call to check on date of appt [phone]. Follow up with Dr. Gladis Easton office as planned for Carotid Artery surveillance. OK to use Tylenol 8 Hour for pain relief- 1-2 pills every 8 Hours as needed for pain. Home BP's- call if > 140/90 on a regular basis. Avoid Aleve, Naproxen, Ibuprofen, Advil, and Motrin OTC to protect kidneys  Continue to drink plenty of water.   Check HGA1C and BMP in 6 months. Continue to work on diet, exercise, and weight loss for optimal cardiovascular health.   Continue current medicines   Refills  Bilateral Ear Irrigation  Follow up in 6 months.

## 2020-02-04 NOTE — PROGRESS NOTES
fatigue, fever and unexpected weight change. Eyes: Negative for visual disturbance. Respiratory: Negative for chest tightness and shortness of breath. Cardiovascular: Positive for leg swelling (occasional with prolonged car rides ). Negative for chest pain and palpitations. Gastrointestinal: Negative for abdominal pain, anal bleeding, blood in stool, constipation, diarrhea, nausea and vomiting. Genitourinary: Negative for dysuria and hematuria. Musculoskeletal: Negative for neck pain. Neurological: Negative for dizziness, light-headedness and headaches. OBJECTIVE     /72 (Site: Left Upper Arm, Position: Sitting, Cuff Size: Medium Adult)   Pulse 64   Temp 97.6 °F (36.4 °C) (Oral)   Resp 16   Ht 5' 2.99\" (1.6 m)   Wt 189 lb 9.6 oz (86 kg)   BMI 33.59 kg/m²   Body mass index is 33.59 kg/m². BP Readings from Last 3 Encounters:   02/04/20 122/72   09/04/19 122/70   07/29/19 126/68     Physical Exam  Vitals signs and nursing note reviewed. Constitutional:       Appearance: She is well-developed. HENT:      Head: Normocephalic and atraumatic. Right Ear: External ear normal. There is impacted cerumen. Left Ear: External ear normal. There is impacted cerumen. Nose: Nose normal.   Eyes:      Conjunctiva/sclera: Conjunctivae normal.      Pupils: Pupils are equal, round, and reactive to light. Neck:      Musculoskeletal: Normal range of motion and neck supple. Cardiovascular:      Rate and Rhythm: Normal rate and regular rhythm. Heart sounds: Normal heart sounds. Pulmonary:      Effort: Pulmonary effort is normal.      Breath sounds: Normal breath sounds. Abdominal:      General: Bowel sounds are normal.      Palpations: Abdomen is soft. Musculoskeletal: Normal range of motion. Skin:     General: Skin is warm and dry. Neurological:      Mental Status: She is alert and oriented to person, place, and time.       Deep Tendon Reflexes: Reflexes are normal and and older) 10/18/2017    Influenza, Quadv, IM, (6 mo and older Fluzone, Flulaval, Fluarix and 3 yrs and older Afluria) 09/19/2016    Influenza, Triv, inactivated, subunit, adjuvanted, IM (Fluad 65 yrs and older) 10/02/2018    Pneumococcal Conjugate 13-valent ([id number]) 02/27/2015    Pneumococcal Polysaccharide ([id number]) 01/01/2008    Tdap (Boostrix, Adacel) 10/02/2018    Tetanus 10/01/2010    Zoster Live (Zostavax) 02/29/2012       Health Maintenance   Topic Date Due    Shingles Vaccine (2 of 3) 04/25/2012    Annual Wellness Visit (AWV)  05/29/2019    Lipid screen  01/28/2021    Potassium monitoring  01/28/2021    Creatinine monitoring  01/28/2021    DEXA (modify frequency per FRAX score)  05/02/2021    DTaP/Tdap/Td vaccine (2 - Td) 10/02/2028    Flu vaccine  Completed    Pneumococcal 65+ years Vaccine  Completed       AAA ultrasound (Male, 65-75, smoked ever) indicated at this time? NO  CT Lung Screen (55-80, 30 pk-yrs, smoking or quit <15 years) indicated at this time? NO, No tobacco for past 35 years.     Future Appointments   Date Time Provider Janelle Bradshaw   8/24/2020  7:45 AM Margaret Iqbal MD [facility]       ASSESSMENT       Diagnosis Orders   1. IFG (impaired fasting glucose)  Hemoglobin A1C   2. Essential hypertension  Basic Metabolic Panel    amLODIPine-benazepril (LOTREL) 5-20 MG per capsule    metoprolol-hydrochlorothiazide (LOPRESSOR HCT) 50-25 MG per tablet   3. Hyperlipidemia, unspecified hyperlipidemia type  rosuvastatin (CRESTOR) 20 MG tablet   4. CKD (chronic kidney disease) stage 3, GFR 30-59 ml/min ([facility])  Basic Metabolic Panel   5. Osteopenia, unspecified location     6. Stenosis of right carotid artery         PLAN      Encouraged NEW SHINGLES SHOT [facility])- check with your local pharmacy. (updated 2/4/2020)  Will hold on further PAP/PELVIC EXAMS- S/P LINDSEY/BSO. (updated 2/4/2020)  MAMMO due after 5/2/2020.    COLONOSCOPY done 11/14/2015 per Dr. Caro Lau- to

## 2020-02-10 ENCOUNTER — TELEPHONE (OUTPATIENT)
Dept: FAMILY MEDICINE CLINIC | Age: 80
End: 2020-02-10

## 2020-02-10 NOTE — TELEPHONE ENCOUNTER
C/O cough, body aches, fever (didn't take temp), chills, nasal congestion, b/l ear pain since Friday. Pt taking Tylenol and Coricidin HBP. Pt thinks she has influenza and would like to have a prescription for Tamiflu sent to [facility], please see list of allergies in the chart. She states she doesn't want to come to the office for the appt because she was just seen last week and she knows she got it from here because \"Dr. Lazaro Green was sick\". Please advise.

## 2020-07-03 ENCOUNTER — APPOINTMENT (OUTPATIENT)
Dept: CT IMAGING | Age: 80
End: 2020-07-03
Payer: MEDICARE

## 2020-07-03 ENCOUNTER — HOSPITAL ENCOUNTER (EMERGENCY)
Age: 80
Discharge: HOME | End: 2020-07-04
Attending: EMERGENCY MEDICINE
Payer: MEDICARE

## 2020-07-03 LAB
ALBUMIN SERPL-MCNC: 4.1 G/DL (ref 3.5–5.1)
ALP BLD-CCNC: 86 U/L (ref 38–126)
ALT SERPL-CCNC: 21 U/L (ref 11–66)
ANION GAP SERPL CALCULATED.3IONS-SCNC: 16 MEQ/L (ref 8–16)
AST SERPL-CCNC: 23 U/L (ref 5–40)
BASOPHILS # BLD: 0.5 %
BASOPHILS ABSOLUTE: 0 THOU/MM3 (ref 0–0.1)
BILIRUB SERPL-MCNC: 0.3 MG/DL (ref 0.3–1.2)
BUN BLDV-MCNC: 32 MG/DL (ref 7–22)
CALCIUM SERPL-MCNC: 9.9 MG/DL (ref 8.5–10.5)
CHLORIDE BLD-SCNC: 98 MEQ/L (ref 98–111)
CO2: 25 MEQ/L (ref 23–33)
CREAT SERPL-MCNC: 1.2 MG/DL (ref 0.4–1.2)
EOSINOPHIL # BLD: 2.3 %
EOSINOPHILS ABSOLUTE: 0.2 THOU/MM3 (ref 0–0.4)
ERYTHROCYTE [DISTWIDTH] IN BLOOD BY AUTOMATED COUNT: 17.9 % (ref 11.5–14.5)
ERYTHROCYTE [DISTWIDTH] IN BLOOD BY AUTOMATED COUNT: 58.4 FL (ref 35–45)
GFR SERPL CREATININE-BSD FRML MDRD: 43 ML/MIN/1.73M2
GLUCOSE BLD-MCNC: 123 MG/DL (ref 70–108)
HCT VFR BLD CALC: 37.3 % (ref 37–47)
HEMOGLOBIN: 12.5 GM/DL (ref 12–16)
IMMATURE GRANS (ABS): 0.02 THOU/MM3 (ref 0–0.07)
IMMATURE GRANULOCYTES: 0.2 %
INR BLD: 1.01 (ref 0.85–1.13)
LIPASE: 47.5 U/L (ref 5.6–51.3)
LYMPHOCYTES # BLD: 17.8 %
LYMPHOCYTES ABSOLUTE: 1.7 THOU/MM3 (ref 1–4.8)
MCH RBC QN AUTO: 30.2 PG (ref 26–33)
MCHC RBC AUTO-ENTMCNC: 33.5 GM/DL (ref 32.2–35.5)
MCV RBC AUTO: 90.1 FL (ref 81–99)
MONOCYTES # BLD: 8.6 %
MONOCYTES ABSOLUTE: 0.8 THOU/MM3 (ref 0.4–1.3)
NUCLEATED RED BLOOD CELLS: 0 /100 WBC
OSMOLALITY CALCULATION: 285.8 MOSMOL/KG (ref 275–300)
PLATELET # BLD: 172 THOU/MM3 (ref 130–400)
PMV BLD AUTO: 10.4 FL (ref 9.4–12.4)
POTASSIUM SERPL-SCNC: 3.3 MEQ/L (ref 3.5–5.2)
RBC # BLD: 4.14 MILL/MM3 (ref 4.2–5.4)
SEG NEUTROPHILS: 70.6 %
SEGMENTED NEUTROPHILS ABSOLUTE COUNT: 6.6 THOU/MM3 (ref 1.8–7.7)
SODIUM BLD-SCNC: 139 MEQ/L (ref 135–145)
TOTAL PROTEIN: 7.1 G/DL (ref 6.1–8)
TROPONIN T: < 0.01 NG/ML
WBC # BLD: 9.3 THOU/MM3 (ref 4.8–10.8)

## 2020-07-03 PROCEDURE — 82550 ASSAY OF CK (CPK): CPT

## 2020-07-03 PROCEDURE — 84484 ASSAY OF TROPONIN QUANT: CPT

## 2020-07-03 PROCEDURE — 36415 COLL VENOUS BLD VENIPUNCTURE: CPT

## 2020-07-03 PROCEDURE — 2709999900 HC NON-CHARGEABLE SUPPLY

## 2020-07-03 PROCEDURE — 80053 COMPREHEN METABOLIC PANEL: CPT

## 2020-07-03 PROCEDURE — 83690 ASSAY OF LIPASE: CPT

## 2020-07-03 PROCEDURE — 85025 COMPLETE CBC W/AUTO DIFF WBC: CPT

## 2020-07-03 PROCEDURE — 96374 THER/PROPH/DIAG INJ IV PUSH: CPT

## 2020-07-03 PROCEDURE — 74176 CT ABD & PELVIS W/O CONTRAST: CPT

## 2020-07-03 PROCEDURE — 96375 TX/PRO/DX INJ NEW DRUG ADDON: CPT

## 2020-07-03 PROCEDURE — 6360000002 HC RX W HCPCS: Performed by: EMERGENCY MEDICINE

## 2020-07-03 PROCEDURE — 76376 3D RENDER W/INTRP POSTPROCES: CPT

## 2020-07-03 PROCEDURE — 85730 THROMBOPLASTIN TIME PARTIAL: CPT

## 2020-07-03 PROCEDURE — 85610 PROTHROMBIN TIME: CPT

## 2020-07-03 PROCEDURE — 99285 EMERGENCY DEPT VISIT HI MDM: CPT

## 2020-07-03 RX ORDER — ACETAMINOPHEN 325 MG/1
650 TABLET ORAL ONCE
Status: DISCONTINUED | OUTPATIENT
Start: 2020-07-03 | End: 2020-07-04 | Stop reason: HOSPADM

## 2020-07-03 RX ORDER — MORPHINE SULFATE 2 MG/ML
2 INJECTION, SOLUTION INTRAMUSCULAR; INTRAVENOUS ONCE
Status: COMPLETED | OUTPATIENT
Start: 2020-07-03 | End: 2020-07-03

## 2020-07-03 RX ORDER — KETOROLAC TROMETHAMINE 30 MG/ML
15 INJECTION, SOLUTION INTRAMUSCULAR; INTRAVENOUS ONCE
Status: COMPLETED | OUTPATIENT
Start: 2020-07-03 | End: 2020-07-03

## 2020-07-03 RX ADMIN — KETOROLAC TROMETHAMINE 15 MG: 30 INJECTION, SOLUTION INTRAMUSCULAR at 23:46

## 2020-07-03 RX ADMIN — MORPHINE SULFATE 2 MG: 2 INJECTION, SOLUTION INTRAMUSCULAR; INTRAVENOUS at 23:47

## 2020-07-03 ASSESSMENT — PAIN DESCRIPTION - LOCATION: LOCATION: BACK

## 2020-07-03 ASSESSMENT — ENCOUNTER SYMPTOMS
DIARRHEA: 0
CONSTIPATION: 0
NAUSEA: 0
BACK PAIN: 1
SHORTNESS OF BREATH: 0
ABDOMINAL DISTENTION: 0
RHINORRHEA: 0
EYE PAIN: 0
SORE THROAT: 0
EYE DISCHARGE: 0
STRIDOR: 0
COUGH: 0
ABDOMINAL PAIN: 0
PHOTOPHOBIA: 0
CHEST TIGHTNESS: 0
EYE ITCHING: 0
WHEEZING: 0
EYE REDNESS: 0
VOMITING: 0

## 2020-07-03 ASSESSMENT — PAIN SCALES - GENERAL
PAINLEVEL_OUTOF10: 10

## 2020-07-03 ASSESSMENT — PAIN DESCRIPTION - DESCRIPTORS: DESCRIPTORS: TIGHTNESS;SQUEEZING

## 2020-07-04 VITALS
BODY MASS INDEX: 33.13 KG/M2 | TEMPERATURE: 97.9 F | HEART RATE: 100 BPM | HEIGHT: 62 IN | RESPIRATION RATE: 18 BRPM | DIASTOLIC BLOOD PRESSURE: 63 MMHG | WEIGHT: 180 LBS | OXYGEN SATURATION: 96 % | SYSTOLIC BLOOD PRESSURE: 130 MMHG

## 2020-07-04 LAB
APTT: 28.5 SECONDS (ref 22–38)
TOTAL CK: 89 U/L (ref 30–135)

## 2020-07-04 RX ORDER — LIDOCAINE 50 MG/G
1 PATCH TOPICAL DAILY
Qty: 15 PATCH | Refills: 0 | Status: SHIPPED | OUTPATIENT
Start: 2020-07-04 | End: 2020-08-03

## 2020-07-04 RX ORDER — METHOCARBAMOL 500 MG/1
500 TABLET, FILM COATED ORAL 3 TIMES DAILY
Qty: 21 TABLET | Refills: 0 | Status: SHIPPED | OUTPATIENT
Start: 2020-07-04 | End: 2020-07-11

## 2020-07-04 ASSESSMENT — PAIN SCALES - GENERAL
PAINLEVEL_OUTOF10: 6
PAINLEVEL_OUTOF10: 6

## 2020-07-04 NOTE — ED TRIAGE NOTES
Pt arrives with c/o back pain. Pt states she \"tipped over\" on Wednesday. Pt has been to chiropractor for adjustment with no relief. Pt states she has taken tylenol for pain and used cbd cream. Pt states pain 10/10 she reports \"worse then labor\". Pt resting in bed comfortably at this time.  at bedside. Call light in reach. assessment complete.

## 2020-07-04 NOTE — ED PROVIDER NOTES
[facility] COMPLAINT       Chief Complaint   Patient presents with    Back Pain       Nurses Notes reviewed and I agreeexcept as noted in the HPI. HISTORY OF PRESENT ILLNESS    Kalen Sawyer is a 68 y.o. female who presents to Emergency Department with Back Pain    68year old female presents c/o back pain. Pt states she \"tipped over\" three days ago. Pt has been to chiropractor for adjustment with no relief. Pt states she has taken tylenol for pain and used OTC cream. Pt states pain 10/10 she reports \"worse then labor\". No head and neck injuries. No lower extremity weakness. No change of bowel and bladder functions. Pain is worse when she moves her back. REVIEW OF SYSTEMS     Review of Systems   Constitutional: Negative for activity change, appetite change, chills, fatigue, fever and unexpected weight change. HENT: Negative for congestion, ear discharge, ear pain, hearing loss, nosebleeds, rhinorrhea and sore throat. Eyes: Negative for photophobia, pain, discharge, redness and itching. Respiratory: Negative for cough, chest tightness, shortness of breath, wheezing and stridor. Cardiovascular: Negative for chest pain, palpitations and leg swelling. Gastrointestinal: Negative for abdominal distention, abdominal pain, constipation, diarrhea, nausea and vomiting. Endocrine: Negative for cold intolerance, heat intolerance, polydipsia and polyphagia. Genitourinary: Negative for dysuria, flank pain, frequency and hematuria. Musculoskeletal: Positive for back pain. Negative for arthralgias, gait problem, myalgias, neck pain and neck stiffness. Skin: Negative for pallor, rash and wound. Allergic/Immunologic: Negative for environmental allergies and food allergies. Neurological: Negative for dizziness, tremors, syncope, weakness and headaches.    Psychiatric/Behavioral: Negative for agitation, behavioral problems, confusion, self-injury, sleep disturbance and suicidal ideas. PAST MEDICAL HISTORY    has a past medical history of Arthritis, Basal cell carcinoma, Carotid artery stenosis, Cataract, Cerebral artery occlusion with cerebral infarction ([address] Hyperlipidemia, Hypertension, Obesity, Osteoarthritis, and PONV (postoperative nausea and vomiting). SURGICAL HISTORY      has a past surgical history that includes Cholecystectomy (); Hysterectomy; Cataract removal (Bilateral, 3/14); Cosmetic surgery; Colonoscopy (); Breast surgery (Bilateral); Carotid endarterectomy (Right, 16); and Skin cancer excision (2016). CURRENT MEDICATIONS       Discharge Medication List as of 2020  2:26 AM      CONTINUE these medications which have NOT CHANGED    Details   amLODIPine-benazepril (LOTREL) 5-20 MG per capsule Take 2 capsules by mouth daily TAKE 2 CAPSULES DAILY, Disp-180 capsule, R-3Normal      metoprolol-hydrochlorothiazide (LOPRESSOR HCT) 50-25 MG per tablet Take 1 tablet by mouth 2 times daily, Disp-180 tablet, R-3Normal      rosuvastatin (CRESTOR) 20 MG tablet Take 1 tablet by mouth nightly, Disp-90 tablet, R-3Normal      Multiple Vitamins-Minerals (EYE VITAMINS PO) Take by mouthHistorical Med      aspirin 81 MG tablet Take 81 mg by mouth daily      mometasone (ELOCON) 0.1 % cream Apply topically as needed Apply topically daily. , Topical, Historical Med      acetaminophen (TYLENOL) 500 MG tablet Take by mouth 2 times daily 2 tab      Lactobacillus Acidophilus (ACIDOPHILUS LACTOBACILLUS) Take by mouth Every other dayHistorical Med      Coenzyme Q10 (CO Q 10 PO) Take by mouth Every other dayHistorical Med             ALLERGIES     is allergic to percocet [oxycodone-acetaminophen]; doxycycline hyclate; lipitor [atorvastatin]; metronidazole; tape [adhesive tape]; betadine [povidone iodine]; codeine; and pcn [penicillins]. FAMILY HISTORY     She indicated that her mother is .  She indicated that her father is . She indicated that her sister is . family history includes Cancer in her sister; Diabetes in her father; Heart Disease in her father and mother; High Blood Pressure in her father; Stroke in her mother. SOCIAL HISTORY      reports that she quit smoking about 25 years ago. She has never used smokeless tobacco. She reports current alcohol use. She reports that she does not use drugs. PHYSICAL EXAM     INITIAL VITALS:  height is 5' 2\" (1.575 m) and weight is 180 lb (81.6 kg). Her oral temperature is 97.9 °F (36.6 °C). Her blood pressure is 130/63 and her pulse is 100. Her respiration is 18 and oxygen saturation is 96%. Physical Exam  Vitals signs and nursing note reviewed. Constitutional:       Appearance: She is well-developed. She is not diaphoretic. HENT:      Head: Normocephalic and atraumatic. Nose: Nose normal.   Eyes:      General: No scleral icterus. Right eye: No discharge. Left eye: No discharge. Conjunctiva/sclera: Conjunctivae normal.      Pupils: Pupils are equal, round, and reactive to light. Neck:      Musculoskeletal: Normal range of motion and neck supple. Vascular: No JVD. Trachea: No tracheal deviation. Cardiovascular:      Rate and Rhythm: Normal rate and regular rhythm. Heart sounds: Normal heart sounds. No murmur. No friction rub. No gallop. Pulmonary:      Effort: Pulmonary effort is normal. No respiratory distress. Breath sounds: Normal breath sounds. No stridor. No wheezing or rales. Chest:      Chest wall: No tenderness. Abdominal:      General: Bowel sounds are normal. There is no distension. Palpations: Abdomen is soft. There is no mass. Tenderness: There is no abdominal tenderness. There is no guarding or rebound. Hernia: No hernia is present. Musculoskeletal:         General: No tenderness or deformity.         Back:    Lymphadenopathy:      Cervical: No cervical adenopathy. Skin:     General: Skin is warm and dry. Capillary Refill: Capillary refill takes less than 2 seconds. Coloration: Skin is not pale. Findings: No erythema or rash. Neurological:      Mental Status: She is alert and oriented to person, place, and time. Cranial Nerves: No cranial nerve deficit. Sensory: No sensory deficit. Motor: No abnormal muscle tone. Coordination: Coordination normal.      Deep Tendon Reflexes: Reflexes normal.   Psychiatric:         Behavior: Behavior normal.         Thought Content: Thought content normal.         Judgment: Judgment normal.           DIFFERENTIAL DIAGNOSIS:   Back strain, lumbar fracture, UTI    DIAGNOSTIC RESULTS     EKG: All EKG's are interpreted by the Emergency Department Physician who either signs or Co-signsthis chart in the absence of a cardiologist.  Interpreted by me  None    RADIOLOGY: non-plain film images(s) such as CT, Ultrasound and MRI are read by the radiologist.    CT ABDOMEN PELVIS WO CONTRAST Additional Contrast? None   Final Result   1. Minimal right medial lung base groundglass changes, correlate with mild bronchitis. 2. Negative exam for acute pathology of the abdomen or pelvis. 3. Nonobstructing cystic change of the right kidney. 4. Colonic diverticular changes present. CT lumbar spine without contrast      INDICATION: Trauma      FINDINGS: There is preservation of alignment of the vertebral column with no acute fracture. There is mild degenerative disc changes in the lumbar and lower thoracic spine. Regional soft tissues demonstrate atherosclerosis of the aorta without aneurysm. Disc levels: L1-2: The disc space is slightly narrow. The central canal and foramen remain intact. L2-3: There is mild posterior disc bulge without central canal or foramen impingement. L3-4: There is mild disc bulge. The central canal remains intact. There is no acute foramen impingement.       L4-5: There is a minimal anterolisthesis of L4 on 5 with posterior disc bulge. Facet arthropathy combines to produce chronic canal narrowing moderate to severe. The left foramen is patent. There is minimal narrowing of the right foramen. L5-S1: There is degenerative disc narrowing. The central canal remains intact. There is no significant foramen impingement. Facet arthropathy changes are present. IMPRESSION:.   1. Negative exam for acute fracture of the lumbar spine. 2. Degenerative changes described above at individual levels. **This report has been created using voice recognition software. It may contain minor errors which are inherent in voice recognition technology. **      Final report electronically signed by Dr. Annita Weiss on 7/4/2020 1:54 AM      CT LUMBAR RECONSTRUCTION WO POST PROCESS   Final Result   1. Minimal right medial lung base groundglass changes, correlate with mild bronchitis. 2. Negative exam for acute pathology of the abdomen or pelvis. 3. Nonobstructing cystic change of the right kidney. 4. Colonic diverticular changes present. CT lumbar spine without contrast      INDICATION: Trauma      FINDINGS: There is preservation of alignment of the vertebral column with no acute fracture. There is mild degenerative disc changes in the lumbar and lower thoracic spine. Regional soft tissues demonstrate atherosclerosis of the aorta without aneurysm. Disc levels: L1-2: The disc space is slightly narrow. The central canal and foramen remain intact. L2-3: There is mild posterior disc bulge without central canal or foramen impingement. L3-4: There is mild disc bulge. The central canal remains intact. There is no acute foramen impingement. L4-5: There is a minimal anterolisthesis of L4 on 5 with posterior disc bulge. Facet arthropathy combines to produce chronic canal narrowing moderate to severe. The left foramen is patent.  There is minimal narrowing of the right 86 38 - 126 U/L    Total Protein 7.1 6.1 - 8.0 g/dL    Alb 4.1 3.5 - 5.1 g/dL    Total Bilirubin 0.3 0.3 - 1.2 mg/dL    ALT 21 11 - 66 U/L   Lipase   Result Value Ref Range    Lipase 47.5 5.6 - 51.3 U/L   CK   Result Value Ref Range    Total CK 89 30 - 135 U/L   APTT   Result Value Ref Range    aPTT 28.5 22.0 - 38.0 seconds   Protime-INR   Result Value Ref Range    INR 1.01 0.85 - 1.13   Troponin   Result Value Ref Range    Troponin T < 0.010 ng/ml   Anion Gap   Result Value Ref Range    Anion Gap 16.0 8.0 - 16.0 meq/L   Glomerular Filtration Rate, Estimated   Result Value Ref Range    Est, Glom Filt Rate 43 (A) ml/min/1.73m2   Osmolality   Result Value Ref Range    Osmolality Calc 285.8 275.0 - 300 mOsmol/kg       EMERGENCY DEPARTMENT COURSE:   Vitals:    Vitals:    07/03/20 2317 07/04/20 0018 07/04/20 0126 07/04/20 0239   BP: 136/69 135/73 136/62 130/63   Pulse: 97 82 99 100   Resp: 16 18 16 18   Temp:       TempSrc:       SpO2: 95% 97% 95% 96%   Weight:       Height:           23:29 PM    Patient is seen and evaluated in a timely fashion. Action:     Large bore IV  Labs  CT A/P and L-spine reconstruct  Pain control    MedicalDecision Making    Reassessment: stable. Patient is medicated with following medications during ED stay. Medications   morphine (PF) injection 2 mg (2 mg Intravenous Given 7/3/20 2347)   ketorolac (TORADOL) injection 15 mg (15 mg Intravenous Given 7/3/20 2346)       Pain significantly improved after above ED medications. Labs are reassuring. CT abdomen pelvis with L-spine reconstruct does not reveal acute findings. Patient is reassured and discharged with lidocaine patch and Robaxin. PCP follow-up in 1 week. CRITICAL CARE:   None    CONSULTS:  None    PROCEDURES:  None    FINAL IMPRESSION      1.  Acute left-sided low back pain without sciatica          DISPOSITION/PLAN   Home    PATIENT REFERRED TO:  Kira Cortes MD  [address] [place], [place] [address] [place] [phone]    In 1 week  ED visit follow up      DISCHARGE MEDICATIONS:  Discharge Medication List as of 7/4/2020  2:26 AM      START taking these medications    Details   lidocaine (LIDODERM) 5 % Place 1 patch onto the skin daily 12 hours on, 12 hours off., Disp-15 patch, R-0Print      methocarbamol (ROBAXIN) 500 MG tablet Take 1 tablet by mouth 3 times daily for 7 days, Disp-21 tablet, R-0Print             (Please note that portions of this note were completed with a voice recognition program.  Efforts were made to edit the dictations but occasionally words aremis-transcribed.)    MD Paula Rubi MD  07/04/20 3683

## 2020-07-04 NOTE — ED NOTES
Pt resting in bed. Denies any further needs. Will continue to monitor.       Horacio Dior RN  07/03/20 3809

## 2020-07-07 ENCOUNTER — TELEPHONE (OUTPATIENT)
Dept: FAMILY MEDICINE CLINIC | Age: 80
End: 2020-07-07

## 2020-07-14 ENCOUNTER — TELEPHONE (OUTPATIENT)
Dept: FAMILY MEDICINE CLINIC | Age: 80
End: 2020-07-14

## 2020-11-02 ENCOUNTER — HOSPITAL ENCOUNTER (OUTPATIENT)
Age: 80
Discharge: HOME | End: 2020-11-02
Payer: MEDICARE

## 2020-11-02 LAB
ANION GAP SERPL CALCULATED.3IONS-SCNC: 12 MEQ/L (ref 8–16)
AVERAGE GLUCOSE: 129 MG/DL (ref 70–126)
BUN BLDV-MCNC: 26 MG/DL (ref 7–22)
CALCIUM SERPL-MCNC: 10.1 MG/DL (ref 8.5–10.5)
CHLORIDE BLD-SCNC: 101 MEQ/L (ref 98–111)
CO2: 29 MEQ/L (ref 23–33)
CREAT SERPL-MCNC: 1.1 MG/DL (ref 0.4–1.2)
GFR SERPL CREATININE-BSD FRML MDRD: 48 ML/MIN/1.73M2
GLUCOSE BLD-MCNC: 115 MG/DL (ref 70–108)
HBA1C MFR BLD: 6.3 % (ref 4.4–6.4)
POTASSIUM SERPL-SCNC: 3.5 MEQ/L (ref 3.5–5.2)
SODIUM BLD-SCNC: 142 MEQ/L (ref 135–145)

## 2020-11-02 PROCEDURE — 80048 BASIC METABOLIC PNL TOTAL CA: CPT

## 2020-11-02 PROCEDURE — 36415 COLL VENOUS BLD VENIPUNCTURE: CPT

## 2020-11-02 PROCEDURE — 83036 HEMOGLOBIN GLYCOSYLATED A1C: CPT

## 2020-12-23 NOTE — TELEPHONE ENCOUNTER
Meagan Maurer called requesting a refill on the following medications:  Requested Prescriptions     Pending Prescriptions Disp Refills    amLODIPine-benazepril (LOTREL) 5-20 MG per capsule 180 capsule 3     Sig: Take 2 capsules by mouth daily TAKE 2 [facility] verified: Esteban on [facility]  . pv      Date of last visit: 02/04/2020  Date of next visit (if applicable): 2/02/6579    Pt has not received her mail order supply yet and would like a 30 day supply sent here.

## 2020-12-24 RX ORDER — AMLODIPINE BESYLATE AND BENAZEPRIL HYDROCHLORIDE 5; 20 MG/1; MG/1
2 CAPSULE ORAL DAILY
Qty: 60 CAPSULE | Refills: 0 | Status: SHIPPED | OUTPATIENT
Start: 2020-12-24 | End: 2021-02-23 | Stop reason: SDUPTHER

## 2021-02-23 ENCOUNTER — OFFICE VISIT (OUTPATIENT)
Dept: FAMILY MEDICINE CLINIC | Age: 81
End: 2021-02-23
Payer: MEDICARE

## 2021-02-23 VITALS
WEIGHT: 184.6 LBS | DIASTOLIC BLOOD PRESSURE: 80 MMHG | BODY MASS INDEX: 32.71 KG/M2 | HEIGHT: 63 IN | HEART RATE: 84 BPM | SYSTOLIC BLOOD PRESSURE: 130 MMHG | OXYGEN SATURATION: 97 % | TEMPERATURE: 96.7 F

## 2021-02-23 DIAGNOSIS — E78.5 HYPERLIPIDEMIA, UNSPECIFIED HYPERLIPIDEMIA TYPE: ICD-10-CM

## 2021-02-23 DIAGNOSIS — Z78.0 POSTMENOPAUSAL: ICD-10-CM

## 2021-02-23 DIAGNOSIS — R73.01 IFG (IMPAIRED FASTING GLUCOSE): ICD-10-CM

## 2021-02-23 DIAGNOSIS — M85.80 OSTEOPENIA, UNSPECIFIED LOCATION: ICD-10-CM

## 2021-02-23 DIAGNOSIS — Z12.31 ENCOUNTER FOR SCREENING MAMMOGRAM FOR BREAST CANCER: ICD-10-CM

## 2021-02-23 DIAGNOSIS — I65.21 STENOSIS OF RIGHT CAROTID ARTERY: ICD-10-CM

## 2021-02-23 DIAGNOSIS — Z00.00 ROUTINE GENERAL MEDICAL EXAMINATION AT A HEALTH CARE FACILITY: Primary | ICD-10-CM

## 2021-02-23 DIAGNOSIS — I10 ESSENTIAL HYPERTENSION: ICD-10-CM

## 2021-02-23 PROCEDURE — G0438 PPPS, INITIAL VISIT: HCPCS | Performed by: NURSE PRACTITIONER

## 2021-02-23 RX ORDER — ROSUVASTATIN CALCIUM 20 MG/1
20 TABLET, COATED ORAL NIGHTLY
Qty: 90 TABLET | Refills: 3 | Status: SHIPPED | OUTPATIENT
Start: 2021-02-23 | End: 2021-10-25 | Stop reason: SDUPTHER

## 2021-02-23 RX ORDER — METOPROLOL TARTRATE AND HYDROCHLOROTHIAZIDE 50; 25 MG/1; MG/1
1 TABLET ORAL 2 TIMES DAILY
Qty: 180 TABLET | Refills: 3 | Status: SHIPPED | OUTPATIENT
Start: 2021-02-23 | End: 2021-10-25 | Stop reason: SDUPTHER

## 2021-02-23 RX ORDER — AMLODIPINE BESYLATE AND BENAZEPRIL HYDROCHLORIDE 5; 20 MG/1; MG/1
2 CAPSULE ORAL DAILY
Qty: 180 CAPSULE | Refills: 3 | Status: SHIPPED | OUTPATIENT
Start: 2021-02-23 | End: 2021-10-25 | Stop reason: SDUPTHER

## 2021-02-23 ASSESSMENT — ENCOUNTER SYMPTOMS
VOMITING: 0
SHORTNESS OF BREATH: 0
NAUSEA: 0
ABDOMINAL PAIN: 0
CHEST TIGHTNESS: 0
BLOOD IN STOOL: 0
ANAL BLEEDING: 0
DIARRHEA: 0
CONSTIPATION: 0

## 2021-02-23 ASSESSMENT — LIFESTYLE VARIABLES
AUDIT-C TOTAL SCORE: 1
HOW OFTEN DO YOU HAVE A DRINK CONTAINING ALCOHOL: 1
HOW MANY STANDARD DRINKS CONTAINING ALCOHOL DO YOU HAVE ON A TYPICAL DAY: 0

## 2021-02-23 ASSESSMENT — PATIENT HEALTH QUESTIONNAIRE - PHQ9
1. LITTLE INTEREST OR PLEASURE IN DOING THINGS: 0
2. FEELING DOWN, DEPRESSED OR HOPELESS: 0
SUM OF ALL RESPONSES TO PHQ QUESTIONS 1-9: 0
SUM OF ALL RESPONSES TO PHQ QUESTIONS 1-9: 0

## 2021-02-23 NOTE — PATIENT INSTRUCTIONS
Personalized Preventive Plan for Adonay Hall - 2/23/2021  Medicare offers a range of preventive health benefits. Some of the tests and screenings are paid in full while other may be subject to a deductible, co-insurance, and/or copay. Some of these benefits include a comprehensive review of your medical history including lifestyle, illnesses that may run in your family, and various assessments and screenings as appropriate. After reviewing your medical record and screening and assessments performed today your provider may have ordered immunizations, labs, imaging, and/or referrals for you. A list of these orders (if applicable) as well as your Preventive Care list are included within your After Visit Summary for your review. Other Preventive Recommendations:    · A preventive eye exam performed by an eye specialist is recommended every 1-2 years to screen for glaucoma; cataracts, macular degeneration, and other eye disorders. · A preventive dental visit is recommended every 6 months. · Try to get at least 150 minutes of exercise per week or 10,000 steps per day on a pedometer . · Order or download the FREE \"Exercise & Physical Activity: Your Everyday Guide\" from [employer]. Call 3-325.459.8573 or search [employer] online. · You need 6663-6774 mg of calcium and 0206-5920 IU of vitamin D per day. It is possible to meet your calcium requirement with diet alone, but a vitamin D supplement is usually necessary to meet this goal.  · When exposed to the sun, use a sunscreen that protects against both UVA and UVB radiation with an SPF of 30 or greater. Reapply every 2 to 3 hours or after sweating, drying off with a towel, or swimming. · Always wear a seat belt when traveling in a car. Always wear a helmet when riding a bicycle or motorcycle.

## 2021-02-23 NOTE — PROGRESS NOTES
[facility]  [facility]  Dept: [phone]  Dept Fax: [phone]    SUBJECTIVE     Chief Complaint   Patient presents with    Medicare AWV     no concerns       Salena Freeman is a 68 y. o.female    Pt presents for follow up of IFG, Hypertension, and Hyperlipidemia. Pt feeling ok since last visit- no new complaints, issues, or problems, except as noted below:     Pt continues seeing Dr. Ambar Jain office at this time for known previous Carotid Stenosis- to follow up annually. Wt Readings from Last 3 Encounters:   02/23/21 184 lb 9.6 oz (83.7 kg)   07/03/20 180 lb (81.6 kg)   02/04/20 189 lb 9.6 oz (86 kg)   weight decreased 9# since last visit       Patient Active Problem List   Diagnosis    Hyperlipidemia    IFG (impaired fasting glucose)    Osteopenia    Essential hypertension    Non morbid obesity due to excess calories    Stenosis of right carotid artery     Current Outpatient Medications   Medication Sig Dispense Refill    amLODIPine-benazepril (LOTREL) 5-20 MG per capsule Take 2 capsules by mouth daily TAKE 2 CAPSULES DAILY 180 capsule 3    metoprolol-hydroCHLOROthiazide (LOPRESSOR HCT) 50-25 MG per tablet Take 1 tablet by mouth 2 times daily 180 tablet 3    rosuvastatin (CRESTOR) 20 MG tablet Take 1 tablet by mouth nightly 90 tablet 3    Multiple Vitamins-Minerals (EYE VITAMINS PO) Take by mouth      aspirin 81 MG tablet Take 81 mg by mouth daily      mometasone (ELOCON) 0.1 % cream Apply topically as needed Apply topically daily.  acetaminophen (TYLENOL) 500 MG tablet Take by mouth 2 times daily 2 tab      Lactobacillus Acidophilus (ACIDOPHILUS LACTOBACILLUS) Take by mouth Every other day      Coenzyme Q10 (CO Q 10 PO) Take by mouth Every other day       No current facility-administered medications for this visit. Review of Systems   Constitutional: Negative for chills, diaphoresis, fatigue, fever and unexpected weight change. Eyes: Negative for visual disturbance. Respiratory: Negative for chest tightness and shortness of breath. Cardiovascular: Positive for leg swelling ( stable). Negative for chest pain and palpitations. Gastrointestinal: Negative for abdominal pain, anal bleeding, blood in stool, constipation, diarrhea, nausea and vomiting. Genitourinary: Negative for dysuria and hematuria. Musculoskeletal: Negative for neck pain. Neurological: Negative for dizziness, light-headedness and headaches. OBJECTIVE     /80   Pulse 84   Temp 96.7 °F (35.9 °C) (Temporal)   Ht 5' 2.76\" (1.594 m)   Wt 184 lb 9.6 oz (83.7 kg)   SpO2 97%   BMI 32.96 kg/m²   Body mass index is 32.96 kg/m². BP Readings from Last 3 Encounters:   02/23/21 130/80   07/04/20 130/63   02/04/20 122/72     Physical Exam  Vitals signs and nursing note reviewed. Constitutional:       Appearance: She is well-developed. HENT:      Head: Normocephalic and atraumatic. Right Ear: External ear normal. There is impacted cerumen. Left Ear: External ear normal. There is impacted cerumen. Nose: Nose normal.   Eyes:      Conjunctiva/sclera: Conjunctivae normal.      Pupils: Pupils are equal, round, and reactive to light. Neck:      Musculoskeletal: Normal range of motion and neck supple. Cardiovascular:      Rate and Rhythm: Normal rate and regular rhythm. Heart sounds: Normal heart sounds. Pulmonary:      Effort: Pulmonary effort is normal.      Breath sounds: Normal breath sounds. Abdominal:      General: Bowel sounds are normal.      Palpations: Abdomen is soft. Musculoskeletal: Normal range of motion. Skin:     General: Skin is warm and dry. Neurological:      Mental Status: She is alert and oriented to person, place, and time. Deep Tendon Reflexes: Reflexes are normal and symmetric. Psychiatric:         Behavior: Behavior normal.         Thought Content:  Thought content normal. Judgment: Judgment normal.       Component      Latest Ref Rng & Units 11/2/2020   Sodium      135 - 145 meq/L 142   Potassium      3.5 - 5.2 meq/L 3.5   Chloride      98 - 111 meq/L 101   CO2      23 - 33 meq/L 29   Glucose      70 - 108 mg/dL 115 (H)   BUN      7 - 22 mg/dL 26 (H)   Creatinine      0.4 - 1.2 mg/dL 1.1   Calcium      8.5 - 10.5 mg/dL 10.1   Hemoglobin A1C      4.4 - 6.4 % 6.3   AVERAGE GLUCOSE      70 - 126 mg/dL 129 (H)   Anion Gap      8.0 - 16.0 meq/L 12.0   Est, Glom Filt Rate      ml/min/1.73m2 48 (A)       The 10-year ASCVD risk score (Artur Morataya et al., 2013) is: 29.9%    Values used to calculate the score:      Age: 68 years      Sex: Female      Is Non- : No      Diabetic: No      Tobacco smoker: No      Systolic Blood Pressure: 378 mmHg      Is BP treated: Yes      HDL Cholesterol: 66 mg/dL      Total Cholesterol: 138 mg/dL      Lab Results   Component Value Date    WBC 9.3 07/03/2020    HGB 12.5 07/03/2020    HCT 37.3 07/03/2020    MCV 90.1 07/03/2020     07/03/2020       Immunization History   Administered Date(s) Administered    Influenza 10/19/2011, 11/21/2012, 12/11/2013    Influenza Virus Vaccine 11/21/2014, 10/30/2015, 10/28/2019    Influenza Whole 10/06/2010    Influenza, High Dose (Fluzone 65 yrs and older) 10/18/2017    Influenza, Quadv, IM, (6 mo and older Fluzone, Flulaval, Fluarix and 3 yrs and older Afluria) 09/19/2016    Influenza, Triv, inactivated, subunit, adjuvanted, IM (Fluad 65 yrs and older) 10/02/2018    Pneumococcal Conjugate 13-valent ([id number]) 02/27/2015    Pneumococcal Polysaccharide ([id number]) 01/01/2008    Tdap (Boostrix, Adacel) 10/02/2018    Tetanus 10/01/2010    Zoster Live (Zostavax) 02/29/2012       Health Maintenance   Topic Date Due    Hepatitis C screen  1940    COVID-19 Vaccine (1 of 2) 03/18/1958    Shingles Vaccine (2 of 3) 04/25/2012    Annual Wellness Visit (AWV)  05/29/2019  Flu vaccine (1) 09/01/2020    Lipid screen  01/28/2021    Potassium monitoring  11/02/2021    Creatinine monitoring  11/02/2021    DTaP/Tdap/Td vaccine (2 - Td) 10/02/2028    Pneumococcal 65+ years Vaccine  Completed    Hepatitis A vaccine  Aged Out    Hepatitis B vaccine  Aged Out    Hib vaccine  Aged Out    Meningococcal (ACWY) vaccine  Aged Out       AAA ultrasound (Male, 65-75, smoked ever) indicated at this time? NO  CT Lung Screen (55-80, 30 pk-yrs, smoking or quit <15 years) indicated at this time? NO, No tobacco for past 35 years.     Future Appointments   Date Time Provider Janelle Bradshaw   3/1/2021  2:30 PM STR VASCULAR LAB ROOM 1 STR[facility] VAS LAB STR Radiolog   10/25/2021 12:45 PM Olive Edmondson MD [address] [facility]       ASSESSMENT       Diagnosis Orders   1. Routine general medical examination at a health care facility     2. Essential hypertension  amLODIPine-benazepril (LOTREL) 5-20 MG per capsule    metoprolol-hydroCHLOROthiazide (LOPRESSOR HCT) 50-25 MG per tablet    VL DUP CAROTID BILATERAL    Microalbumin / Creatinine Urine Ratio    T4, Free    TSH without Reflex    BUN    Creatinine, Serum    Hemoglobin K0L    Basic Metabolic Panel   3. Hyperlipidemia, unspecified hyperlipidemia type  rosuvastatin (CRESTOR) 20 MG tablet    Lipid Panel   4. Stenosis of right carotid artery  VL DUP [address] [facility] Monica MD, Cardiothoracic Surgery, [address]   5. Encounter for screening mammogram for breast cancer  RAUDEL DIGITAL SCREEN W OR WO CAD BILATERAL   6. Osteopenia, unspecified location  MAMMO DEXA BONE DENSITY SCAN   7. Postmenopausal  MAMMO DEXA BONE DENSITY SCAN   8. IFG (impaired fasting glucose)  Microalbumin / Creatinine Urine Ratio    T4, Free    TSH without Reflex    BUN    Creatinine, Serum    Hemoglobin A1C       PLAN      Encouraged NEW SHINGLES SHOT [facility])- check with your local pharmacy.  (updated 2/23/2021) Will hold on further PAP/PELVIC EXAMS- S/P LINDSEY/BSO. (updated 2021)  MAMMO ordered  COLONOSCOPY done 2015 per Dr. Ayaka Lu- to consider in  per Dr. Natalia Hernadez (updated 2021)  DEXA done 2019- essentially stable Osteopenia- to continue Calcium with Vitamin D supplementation and weight bearing exercise- pt declines Fosamax, etc at this time- ordered  [address] per Dr. Hilary Gacria 12 months ago- to follow up annually. Pt has upcoming appt. Pt was following with Dr. Denisse Mcmillan office as planned for Carotid Artery surveillance. Pt would like switch to [facility]) provider. OK to use Tylenol 8 Hour for pain relief- 1-2 pills every 8 Hours as needed for pain. Home BP's- call if > 140/90 on a regular basis. Avoid Aleve, Naproxen, Ibuprofen, Advil, and Motrin OTC to protect kidneys  Continue to drink plenty of water.   Check BUN/Cr, lipid, Spot MA, TSH, Free T4 now  Check BMP, A1C in 6 months  Continue to work on diet, exercise, and weight loss for optimal cardiovascular health.   Continue current medicines   Refills sent  Bilat ears irrigated  Follow up in 6 months. Electronically signed by MERRILL Grant CNP on 2021 at 1:03 PM    Medicare Annual Wellness Visit  Name: Rip Caruso Date: 2021   MRN: [id number] Sex: Female   Age: 68 y.o. Ethnicity: Non-/Non    : 1940 Race: Torin Maurer is here for Medicare AWV (no concerns)    Screenings for behavioral, psychosocial and functional/safety risks, and cognitive dysfunction are all negative except as indicated below. These results, as well as other patient data from the [address] [facility] [address] form, are documented in Flowsheets linked to this Encounter.     Allergies   Allergen Reactions    Percocet [Oxycodone-Acetaminophen] Nausea And Vomiting    Doxycycline Hyclate Rash    Lipitor [Atorvastatin] Other (See Comments)     Myalgias    Metronidazole Rash    Tape [Adhesive Tape]      irritation Basal cell CA on left cheek--Dr. Tameka Aguilera         Family History   Problem Relation Age of Onset    Heart Disease Mother         arrhythmias    Stroke Mother     Diabetes Father     Heart Disease Father     High Blood Pressure Father     Cancer Sister         breast       CareTeam (Including outside providers/suppliers regularly involved in providing care):   Patient Care Team:  Franky Song MD as PCP - General (Family Medicine)  Franky Song MD as PCP - [facility] Empaneled Provider    Wt Readings from Last 3 Encounters:   02/23/21 184 lb 9.6 oz (83.7 kg)   07/03/20 180 lb (81.6 kg)   02/04/20 189 lb 9.6 oz (86 kg)     Vitals:    02/23/21 1005   BP: 130/80   Pulse: 84   Temp: 96.7 °F (35.9 °C)   TempSrc: Temporal   SpO2: 97%   Weight: 184 lb 9.6 oz (83.7 kg)   Height: 5' 2.76\" (1.594 m)     Body mass index is 32.96 kg/m². Based upon direct observation of the patient, evaluation of cognition reveals recent and remote memory intact. Patient's complete Health Risk Assessment and screening values have been reviewed and are found in Flowsheets. The following problems were reviewed today and where indicated follow up appointments were made and/or referrals ordered. Positive Risk Factor Screenings with Interventions:      Cognitive: Words recalled: 1 Word Recalled  Clock Drawing Test (CDT) Score: (!) Abnormal  Total Score Interpretation: Positive Mini-Cog  Did the patient refuse to take the cognition test?: No  Cognitive Impairment Interventions:  · No concerns         General Health and ACP:  General  In general, how would you say your health is?: Good  In the past 7 days, have you experienced any of the following?  New or Increased Pain, New or Increased Fatigue, Loneliness, Social Isolation, Stress or Anger?: None of These  Do you get the social and emotional support that you need?: (!) No  Do you have a Living Will?: Yes  Advance Directives Power of [facility] ACP-Advance Directive ACP-Power of     Not on [place] on 02/23/16 Filed Not on File      General Health Risk Interventions:  · Pt reports she is doing well    Health Habits/Nutrition:  Health Habits/Nutrition  Do you exercise for at least 20 minutes 2-3 times per week?: (!) No  Have you lost any weight without trying in the past 3 months?: No  Do you eat only one meal per day?: No  Have you seen the dentist within the past year?: (!) No  Body mass index: (!) 32.95  Health Habits/Nutrition Interventions:  · Inadequate physical activity:  pt to increase physical activity     Safety:  Safety  Do you have working smoke detectors?: Yes  Have all throw rugs been removed or fastened?: (!) No  Do you have non-slip mats or surfaces in all bathtubs/showers?: Yes  Do all of your stairways have a railing or banister?: Yes  Are your doorways, halls and stairs free of clutter?: Yes  Do you always fasten your seatbelt when you are in a car?: Yes  Safety Interventions:  · Home safety tips provided     Personalized Preventive Plan   Current Health Maintenance Status  Immunization History   Administered Date(s) Administered    Influenza 10/19/2011, 11/21/2012, 12/11/2013    Influenza Virus Vaccine 11/21/2014, 10/30/2015, 10/28/2019    Influenza Whole 10/06/2010    Influenza, High Dose (Fluzone 65 yrs and older) 10/18/2017    Influenza, Quadv, IM, (6 mo and older Fluzone, Flulaval, Fluarix and 3 yrs and older Afluria) 09/19/2016    Influenza, Triv, inactivated, subunit, adjuvanted, IM (Fluad 65 yrs and older) 10/02/2018    Pneumococcal Conjugate 13-valent ([id number]) 02/27/2015    Pneumococcal Polysaccharide ([id number]) 01/01/2008    Tdap (Boostrix, Adacel) 10/02/2018    Tetanus 10/01/2010    Zoster Live (Zostavax) 02/29/2012        Health Maintenance   Topic Date Due    Hepatitis C screen  1940    COVID-19 Vaccine (1 of 2) 03/18/1958  Shingles Vaccine (2 of 3) 04/25/2012    Annual Wellness Visit (AWV)  05/29/2019    Flu vaccine (1) 09/01/2020    Lipid screen  01/28/2021    Potassium monitoring  11/02/2021    Creatinine monitoring  11/02/2021    DTaP/Tdap/Td vaccine (2 - Td) 10/02/2028    Pneumococcal 65+ years Vaccine  Completed    Hepatitis A vaccine  Aged Out    Hepatitis B vaccine  Aged Out    Hib vaccine  Aged Out    Meningococcal (ACWY) vaccine  Aged Out     Recommendations for "[employer] Due: see orders and patient instructions/AVS.  . Recommended screening schedule for the next 5-10 years is provided to the patient in written form: see Patient Huber Barrett was seen today for medicare awv. Diagnoses and all orders for this visit:    Routine general medical examination at a health care facility    Essential hypertension  -     amLODIPine-benazepril (LOTREL) 5-20 MG per capsule; Take 2 capsules by mouth daily TAKE 2 CAPSULES DAILY  -     metoprolol-hydroCHLOROthiazide (LOPRESSOR HCT) 50-25 MG per tablet; Take 1 tablet by mouth 2 times daily  -     VL DUP CAROTID BILATERAL; Future  -     Microalbumin / Creatinine Urine Ratio; Future  -     T4, Free; Future  -     TSH without Reflex; Future  -     BUN; Future  -     Creatinine, Serum; Future  -     Hemoglobin A1C; Future  -     Basic Metabolic Panel; Future    Hyperlipidemia, unspecified hyperlipidemia type  -     rosuvastatin (CRESTOR) 20 MG tablet; Take 1 tablet by mouth nightly  -     Lipid Panel; Future    Stenosis of right carotid artery  -     VL DUP CAROTID BILATERAL; Future  -     [facility]: VL DUP CAROTID BILATERAL; Future    Encounter for screening mammogram for breast cancer  -     RAUDEL DIGITAL SCREEN W OR WO CAD BILATERAL;  Future    Osteopenia, unspecified location  -     MAMMO DEXA BONE DENSITY SCAN; Future    Postmenopausal  -     MAMMO DEXA BONE DENSITY SCAN; Future IFG (impaired fasting glucose)  -     Microalbumin / Creatinine Urine Ratio; Future  -     T4, Free; Future  -     TSH without Reflex; Future  -     BUN; Future  -     Creatinine, Serum;  Future  -     Hemoglobin A1C; Future

## 2021-02-23 NOTE — PROGRESS NOTES
Bilat ear wash was preformed. Alligator forceps and curette was used. Ear drums were visible and clear.

## 2021-02-25 ENCOUNTER — TELEPHONE (OUTPATIENT)
Dept: FAMILY MEDICINE CLINIC | Age: 81
End: 2021-02-25

## 2021-02-25 NOTE — TELEPHONE ENCOUNTER
Scheduled pt for vaccine Friday 2/26/2021 at 2:50pm.  Clinic is at [facility] on [address] [place], [address].

## 2021-02-26 ENCOUNTER — IMMUNIZATION (OUTPATIENT)
Dept: PRIMARY CARE CLINIC | Age: 81
End: 2021-02-26
Payer: MEDICARE

## 2021-02-26 PROCEDURE — 91300 COVID-19, PFIZER VACCINE 30MCG/0.3ML DOSE: CPT | Performed by: FAMILY MEDICINE

## 2021-02-26 PROCEDURE — 0001A COVID-19, PFIZER VACCINE 30MCG/0.3ML DOSE: CPT | Performed by: FAMILY MEDICINE

## 2021-03-01 ENCOUNTER — HOSPITAL ENCOUNTER (OUTPATIENT)
Dept: INTERVENTIONAL RADIOLOGY/VASCULAR | Age: 81
Discharge: HOME | End: 2021-03-01
Payer: MEDICARE

## 2021-03-01 DIAGNOSIS — I65.21 STENOSIS OF RIGHT CAROTID ARTERY: ICD-10-CM

## 2021-03-01 DIAGNOSIS — I10 ESSENTIAL HYPERTENSION: ICD-10-CM

## 2021-03-01 PROCEDURE — 93880 EXTRACRANIAL BILAT STUDY: CPT

## 2021-03-08 ENCOUNTER — OFFICE VISIT (OUTPATIENT)
Dept: CARDIOTHORACIC SURGERY | Age: 81
End: 2021-03-08
Payer: MEDICARE

## 2021-03-08 VITALS
SYSTOLIC BLOOD PRESSURE: 124 MMHG | BODY MASS INDEX: 32.6 KG/M2 | HEIGHT: 63 IN | HEART RATE: 86 BPM | DIASTOLIC BLOOD PRESSURE: 74 MMHG | WEIGHT: 184 LBS

## 2021-03-08 DIAGNOSIS — I65.23 BILATERAL CAROTID ARTERY STENOSIS: Primary | ICD-10-CM

## 2021-03-08 PROCEDURE — 99203 OFFICE O/P NEW LOW 30 MIN: CPT | Performed by: THORACIC SURGERY (CARDIOTHORACIC VASCULAR SURGERY)

## 2021-03-15 ENCOUNTER — TELEPHONE (OUTPATIENT)
Dept: FAMILY MEDICINE CLINIC | Age: 81
End: 2021-03-15

## 2021-03-15 ENCOUNTER — HOSPITAL ENCOUNTER (OUTPATIENT)
Age: 81
Discharge: HOME | End: 2021-03-15
Payer: MEDICARE

## 2021-03-15 DIAGNOSIS — R73.01 IFG (IMPAIRED FASTING GLUCOSE): ICD-10-CM

## 2021-03-15 DIAGNOSIS — R79.9 ELEVATED BUN: Primary | ICD-10-CM

## 2021-03-15 DIAGNOSIS — E78.5 HYPERLIPIDEMIA, UNSPECIFIED HYPERLIPIDEMIA TYPE: ICD-10-CM

## 2021-03-15 DIAGNOSIS — I10 ESSENTIAL HYPERTENSION: ICD-10-CM

## 2021-03-15 LAB
BUN BLDV-MCNC: 26 MG/DL (ref 7–22)
CHOLESTEROL, TOTAL: 138 MG/DL (ref 100–199)
CREAT SERPL-MCNC: 0.9 MG/DL (ref 0.4–1.2)
CREATININE, URINE: 63.4 MG/DL
GFR SERPL CREATININE-BSD FRML MDRD: 60 ML/MIN/1.73M2
HDLC SERPL-MCNC: 57 MG/DL
LDL CHOLESTEROL CALCULATED: 54 MG/DL
MICROALBUMIN UR-MCNC: < 1.2 MG/DL
MICROALBUMIN/CREAT UR-RTO: 19 MG/G (ref 0–30)
T4 FREE: 1.34 NG/DL (ref 0.93–1.76)
TRIGL SERPL-MCNC: 135 MG/DL (ref 0–199)
TSH SERPL DL<=0.05 MIU/L-ACNC: 2.38 UIU/ML (ref 0.4–4.2)

## 2021-03-15 PROCEDURE — 82565 ASSAY OF CREATININE: CPT

## 2021-03-15 PROCEDURE — 84439 ASSAY OF FREE THYROXINE: CPT

## 2021-03-15 PROCEDURE — 80061 LIPID PANEL: CPT

## 2021-03-15 PROCEDURE — 84520 ASSAY OF UREA NITROGEN: CPT

## 2021-03-15 PROCEDURE — 36415 COLL VENOUS BLD VENIPUNCTURE: CPT

## 2021-03-15 PROCEDURE — 84443 ASSAY THYROID STIM HORMONE: CPT

## 2021-03-15 PROCEDURE — 82043 UR ALBUMIN QUANTITATIVE: CPT

## 2021-03-15 NOTE — TELEPHONE ENCOUNTER
----- Message from Keshia Wood, MERRILL - CNP sent at 3/15/2021 12:27 PM EDT -----  Spot MA is normal  TSH and Free T4 are normal  BUN is elevated at 26. This is stable from previous. Creatinine is normal  Lipid panel looks great. Pt has labs for 6 months. Please add HFP to this.  Thanks, TS

## 2021-03-19 ENCOUNTER — IMMUNIZATION (OUTPATIENT)
Dept: PRIMARY CARE CLINIC | Age: 81
End: 2021-03-19
Payer: MEDICARE

## 2021-03-19 PROCEDURE — 0002A COVID-19, PFIZER VACCINE 30MCG/0.3ML DOSE: CPT | Performed by: FAMILY MEDICINE

## 2021-03-19 PROCEDURE — 91300 COVID-19, PFIZER VACCINE 30MCG/0.3ML DOSE: CPT | Performed by: FAMILY MEDICINE

## 2021-10-18 ENCOUNTER — HOSPITAL ENCOUNTER (OUTPATIENT)
Age: 81
Discharge: HOME | End: 2021-10-18
Payer: MEDICARE

## 2021-10-18 DIAGNOSIS — I10 ESSENTIAL HYPERTENSION: ICD-10-CM

## 2021-10-18 DIAGNOSIS — R79.9 ELEVATED BUN: ICD-10-CM

## 2021-10-18 DIAGNOSIS — R73.01 IFG (IMPAIRED FASTING GLUCOSE): ICD-10-CM

## 2021-10-18 LAB
ALBUMIN SERPL-MCNC: 4.5 G/DL (ref 3.5–5.1)
ALP BLD-CCNC: 65 U/L (ref 38–126)
ALT SERPL-CCNC: 12 U/L (ref 11–66)
ANION GAP SERPL CALCULATED.3IONS-SCNC: 13 MEQ/L (ref 8–16)
AST SERPL-CCNC: 17 U/L (ref 5–40)
AVERAGE GLUCOSE: 144 MG/DL (ref 70–126)
BILIRUB SERPL-MCNC: 0.4 MG/DL (ref 0.3–1.2)
BILIRUBIN DIRECT: < 0.2 MG/DL (ref 0–0.3)
BUN BLDV-MCNC: 25 MG/DL (ref 7–22)
CALCIUM SERPL-MCNC: 9.8 MG/DL (ref 8.5–10.5)
CHLORIDE BLD-SCNC: 102 MEQ/L (ref 98–111)
CO2: 27 MEQ/L (ref 23–33)
CREAT SERPL-MCNC: 1.1 MG/DL (ref 0.4–1.2)
GFR SERPL CREATININE-BSD FRML MDRD: 48 ML/MIN/1.73M2
GLUCOSE BLD-MCNC: 115 MG/DL (ref 70–108)
HBA1C MFR BLD: 6.8 % (ref 4.4–6.4)
POTASSIUM SERPL-SCNC: 3.5 MEQ/L (ref 3.5–5.2)
SODIUM BLD-SCNC: 142 MEQ/L (ref 135–145)
TOTAL PROTEIN: 7.1 G/DL (ref 6.1–8)

## 2021-10-18 PROCEDURE — 36415 COLL VENOUS BLD VENIPUNCTURE: CPT

## 2021-10-18 PROCEDURE — 82248 BILIRUBIN DIRECT: CPT

## 2021-10-18 PROCEDURE — 83036 HEMOGLOBIN GLYCOSYLATED A1C: CPT

## 2021-10-18 PROCEDURE — 80053 COMPREHEN METABOLIC PANEL: CPT

## 2021-10-23 NOTE — PROGRESS NOTES
[facility]  [facility]  Dept: [phone]  Dept Fax: [phone]    Valarie Robbins is a 80 y. o.female    Pt presents for follow up of IFG, Hypertension, and Hyperlipidemia. Pt feeling ok since last visit- interval history and any new issues noted below:     Pt had steroid injection in left knee due to OA on 4/20/2021 at [facility]- pt then had \"gel shot\" in same knee in 6/2021- felt better  Knee is starting to hurt again and pt considering repeat injection at this time. Pt to see Dr. Wesley Oppenheim office at this time for known previous Carotid Stenosis- to call for appt. Glucometer readings at home are not checked regularly. The home BP readings have not been checked regularly. Wt Readings from Last 3 Encounters:   10/25/21 185 lb 14.4 oz (84.3 kg)   03/08/21 184 lb (83.5 kg)   02/23/21 184 lb 9.6 oz (83.7 kg)   Weight increased 1# since last visit  months ago. Patient Active Problem List   Diagnosis    Hyperlipidemia    IFG (impaired fasting glucose)    Osteopenia    Essential hypertension    Non morbid obesity due to excess calories    Stenosis of right carotid artery       Current Outpatient Medications   Medication Sig Dispense Refill    calcium-vitamin D (OSCAL-500) 500-200 MG-UNIT per tablet Take 1 tablet by mouth daily      amLODIPine-benazepril (LOTREL) 5-20 MG per capsule Take 2 capsules by mouth daily TAKE 2 CAPSULES DAILY 180 capsule 3    metoprolol-hydroCHLOROthiazide (LOPRESSOR HCT) 50-25 MG per tablet Take 1 tablet by mouth 2 times daily 180 tablet 3    rosuvastatin (CRESTOR) 20 MG tablet Take 1 tablet by mouth nightly 90 tablet 3    Multiple Vitamins-Minerals (EYE VITAMINS PO) Take by mouth      aspirin 81 MG tablet Take 81 mg by mouth daily      mometasone (ELOCON) 0.1 % cream Apply topically as needed Apply topically daily.       acetaminophen (TYLENOL) 500 MG tablet Take by mouth 2 times daily 2 tab      Coenzyme Q10 (CO Q 10 PO) Take by mouth Every other day       No current facility-administered medications for this visit. Review of Systems   Constitutional: Negative for chills, diaphoresis, fatigue, fever and unexpected weight change. Eyes: Negative for visual disturbance. Respiratory: Negative for chest tightness and shortness of breath. Cardiovascular: Positive for leg swelling (occasional with long car rides. ). Negative for chest pain and palpitations. Gastrointestinal: Positive for diarrhea (better with occasional Metamucil. ). Negative for abdominal pain, anal bleeding, blood in stool, constipation, nausea and vomiting. Genitourinary: Negative for dysuria and hematuria. Musculoskeletal: Negative for neck pain. Neurological: Negative for dizziness, light-headedness and headaches. OBJECTIVE     /76 (Site: Right Upper Arm, Position: Sitting, Cuff Size: Medium Adult)   Pulse 64   Resp 18   Ht 5' 3\" (1.6 m)   Wt 185 lb 14.4 oz (84.3 kg)   BMI 32.93 kg/m²   Body mass index is 32.93 kg/m². BP Readings from Last 3 Encounters:   10/25/21 124/76   03/08/21 124/74   02/23/21 130/80     Physical Exam  Vitals and nursing note reviewed. Constitutional:       Appearance: She is well-developed. HENT:      Head: Normocephalic and atraumatic. Right Ear: External ear normal.      Left Ear: External ear normal.      Nose: Nose normal.   Eyes:      Conjunctiva/sclera: Conjunctivae normal.      Pupils: Pupils are equal, round, and reactive to light. Cardiovascular:      Rate and Rhythm: Normal rate and regular rhythm. Heart sounds: Murmur (1-2/6 ANLAIA @ LUSB=RUSB) heard. Pulmonary:      Effort: Pulmonary effort is normal.      Breath sounds: Normal breath sounds. Abdominal:      General: Bowel sounds are normal.      Palpations: Abdomen is soft. Musculoskeletal:         General: Normal range of motion. Cervical back: Normal range of motion and neck supple.    Skin:     General: Skin is warm and dry. Neurological:      Mental Status: She is alert and oriented to person, place, and time. Deep Tendon Reflexes: Reflexes are normal and symmetric. Psychiatric:         Behavior: Behavior normal.         Thought Content: Thought content normal.         Judgment: Judgment normal.         Component      Latest Ref Rng & Units 10/18/2021   Sodium      135 - 145 meq/L 142   Potassium      3.5 - 5.2 meq/L 3.5   Chloride      98 - 111 meq/L 102   CO2      23 - 33 meq/L 27   Glucose      70 - 108 mg/dL 115 (H)   BUN      7 - 22 mg/dL 25 (H)   Creatinine      0.4 - 1.2 mg/dL 1.1   Calcium      8.5 - 10.5 mg/dL 9.8   Albumin      3.5 - 5.1 g/dL 4.5   Bilirubin      0.3 - 1.2 mg/dL 0.4   Bilirubin, Direct      0.0 - 0.3 mg/dL <0.2   Alk Phos      38 - 126 U/L 65   AST      5 - 40 U/L 17   ALT      11 - 66 U/L 12   Total Protein      6.1 - 8.0 g/dL 7.1   Hemoglobin A1C      4.4 - 6.4 % 6.8 (H)   AVERAGE GLUCOSE      70 - 126 mg/dL 144 (H)   Anion Gap      8.0 - 16.0 meq/L 13.0   Est, Glom Filt Rate      ml/min/1.73m2 48 (A)  Stable since 8/2015       No results found for this visit on 10/25/21.     Lab Results   Component Value Date    LABA1C 6.8 (H) 10/18/2021       Lab Results   Component Value Date    CHOL 138 03/15/2021    TRIG 135 03/15/2021    HDL 57 03/15/2021    LDLCALC 54 03/15/2021    LDLDIRECT 65.48 06/22/2016       The 10-year ASCVD risk score (Jelani Mayfield, et al., 2013) is: 30.3%    Values used to calculate the score:      Age: 80 years      Sex: Female      Is Non- : No      Diabetic: No      Tobacco smoker: No      Systolic Blood Pressure: 913 mmHg      Is BP treated: Yes      HDL Cholesterol: 57 mg/dL      Total Cholesterol: 138 mg/dL    Lab Results   Component Value Date     10/18/2021    K 3.5 10/18/2021     10/18/2021    CO2 27 10/18/2021    BUN 25 (H) 10/18/2021    CREATININE 1.1 10/18/2021    GLUCOSE 115 (H) 10/18/2021    CALCIUM 9.8 10/18/2021    PROT 7.1 screen  Discontinued       CT Lung Screen (50-80, 20 pk-yrs, smoking or quit <15 years) indicated at this time? No, no tobacco for >35 years      Future Appointments   Date Time Provider Janelle Bradshaw   11/11/2021  2:20 PM STR MAMMOGRAPHY RM2  [facility] Radiolog   11/11/2021  3:30 PM [facility] DEXA RM [facility] STR Radiolog   3/1/2022 10:00 AM STR VASCULAR LAB ROOM 1 [facility] VAS LAB STR Radiolog   3/7/2022 11:30 AM Ania Hernandez, MD [facility] [place]   6/13/2022  1:15 PM Ko Mena MD [address] [facility]         ASSESSMENT       Diagnosis Orders   1. Diet-controlled diabetes mellitus (HCC)  Hemoglobin A1C    Microalbumin / Creatinine Urine Ratio    T4, Free    TSH without Reflex    CBC Auto Differential   2. Essential hypertension  Basic Metabolic Panel    T4, Free    TSH without Reflex    CBC Auto Differential    amLODIPine-benazepril (LOTREL) 5-20 MG per capsule    metoprolol-hydroCHLOROthiazide (LOPRESSOR HCT) 50-25 MG per tablet   3. Hyperlipidemia, unspecified hyperlipidemia type  Lipid Panel    CBC Auto Differential    rosuvastatin (CRESTOR) 20 MG tablet   4. Stenosis of right carotid artery  CBC Auto Differential   5. Osteopenia, unspecified location  CBC Auto Differential   6. Needs flu shot  INFLUENZA, QUADV, ADJUVANTED, 72 YRS =, IM, PF, PREFILL SYR, 0.5ML (FLUAD)       PLAN      Pt to get in touch with [facility] regarding future knee injection. Follow up with Dr. Loren Bell office as planned for Carotid Artery surveillance. Encouraged pt to drink 80 ounces of water daily. Avoid Advil, Motrin, Ibuprofen, Aleve, and Naproxen as these can worsen your kidney function. It is ok for Tylenol 8 Hour- 1-2 pills every 8 hours as needed for pain. After discussion with pt, pt would like to hold on referral to [facility], regular sugar testing, or medication at this time, and instead, continue to work on diet, exercise, and weight loss.     Check HGA1C, FLP, BMP, spot MA, free T4/TSH, and CBC in 6-8 months  Continue current medicines   Refills  Follow up in 6-8 months. Preventive Health Topics:  Pt declines HEP C screening at this time due to lack of risk factors. Encouraged COVID VACCINE booster at this time  High-dose flu shot today  Encouraged SHINGLES SHOT [facility])- check with your local pharmacy. (updated 10/25/2021)  Will hold on further PAP/PELVIC EXAMS- S/P LINDSEY/BSO. (updated 10/25/2021)  MAMMO scheduled for 11/11/2021. COLONOSCOPY done 11/14/2015 per Dr. Mehnaz Saha- to consider in 2025 per Dr. Lazaro Velasco (updated 10/25/2021)  DEXA done 5/2/2019- essentially stable Osteopenia- to continue Calcium with Vitamin D supplementation and weight bearing exercise- pt declines Fosamax, etc at this time- scheduled for 11/11/20221.  (updated 10/25/2021)  [address] per Dr. Sheree Lam at this time.   Please call to schedule appt [phone]. (updated 10/25/2021)             Electronically signed by Marcia Michael MD on 10/25/2021 at 5:43 PM

## 2021-10-25 ENCOUNTER — OFFICE VISIT (OUTPATIENT)
Dept: FAMILY MEDICINE CLINIC | Age: 81
End: 2021-10-25
Payer: MEDICARE

## 2021-10-25 VITALS
HEIGHT: 63 IN | DIASTOLIC BLOOD PRESSURE: 76 MMHG | RESPIRATION RATE: 18 BRPM | SYSTOLIC BLOOD PRESSURE: 124 MMHG | WEIGHT: 185.9 LBS | BODY MASS INDEX: 32.94 KG/M2 | HEART RATE: 64 BPM

## 2021-10-25 DIAGNOSIS — E78.5 HYPERLIPIDEMIA, UNSPECIFIED HYPERLIPIDEMIA TYPE: ICD-10-CM

## 2021-10-25 DIAGNOSIS — Z23 NEEDS FLU SHOT: ICD-10-CM

## 2021-10-25 DIAGNOSIS — I65.21 STENOSIS OF RIGHT CAROTID ARTERY: ICD-10-CM

## 2021-10-25 DIAGNOSIS — I10 ESSENTIAL HYPERTENSION: ICD-10-CM

## 2021-10-25 DIAGNOSIS — M85.80 OSTEOPENIA, UNSPECIFIED LOCATION: ICD-10-CM

## 2021-10-25 DIAGNOSIS — E11.9 DIET-CONTROLLED DIABETES MELLITUS (HCC): Primary | ICD-10-CM

## 2021-10-25 PROCEDURE — 90694 VACC AIIV4 NO PRSRV 0.5ML IM: CPT | Performed by: FAMILY MEDICINE

## 2021-10-25 PROCEDURE — 99214 OFFICE O/P EST MOD 30 MIN: CPT | Performed by: FAMILY MEDICINE

## 2021-10-25 PROCEDURE — G0008 ADMIN INFLUENZA VIRUS VAC: HCPCS | Performed by: FAMILY MEDICINE

## 2021-10-25 RX ORDER — ROSUVASTATIN CALCIUM 20 MG/1
20 TABLET, COATED ORAL NIGHTLY
Qty: 90 TABLET | Refills: 3 | Status: SHIPPED | OUTPATIENT
Start: 2021-10-25

## 2021-10-25 RX ORDER — AMLODIPINE BESYLATE AND BENAZEPRIL HYDROCHLORIDE 5; 20 MG/1; MG/1
2 CAPSULE ORAL DAILY
Qty: 180 CAPSULE | Refills: 3 | Status: SHIPPED | OUTPATIENT
Start: 2021-10-25

## 2021-10-25 RX ORDER — METOPROLOL TARTRATE AND HYDROCHLOROTHIAZIDE 50; 25 MG/1; MG/1
1 TABLET ORAL 2 TIMES DAILY
Qty: 180 TABLET | Refills: 3 | Status: SHIPPED | OUTPATIENT
Start: 2021-10-25

## 2021-10-25 RX ORDER — OYSTER SHELL CALCIUM WITH VITAMIN D 500; 200 MG/1; [IU]/1
1 TABLET, FILM COATED ORAL DAILY
COMMUNITY

## 2021-10-25 SDOH — ECONOMIC STABILITY: FOOD INSECURITY: WITHIN THE PAST 12 MONTHS, THE FOOD YOU BOUGHT JUST DIDN'T LAST AND YOU DIDN'T HAVE MONEY TO GET MORE.: NEVER TRUE

## 2021-10-25 SDOH — ECONOMIC STABILITY: FOOD INSECURITY: WITHIN THE PAST 12 MONTHS, YOU WORRIED THAT YOUR FOOD WOULD RUN OUT BEFORE YOU GOT MONEY TO BUY MORE.: NEVER TRUE

## 2021-10-25 ASSESSMENT — ENCOUNTER SYMPTOMS
SHORTNESS OF BREATH: 0
BLOOD IN STOOL: 0
DIARRHEA: 1
VOMITING: 0
NAUSEA: 0
CHEST TIGHTNESS: 0
CONSTIPATION: 0
ABDOMINAL PAIN: 0
ANAL BLEEDING: 0

## 2021-10-25 ASSESSMENT — SOCIAL DETERMINANTS OF HEALTH (SDOH): HOW HARD IS IT FOR YOU TO PAY FOR THE VERY BASICS LIKE FOOD, HOUSING, MEDICAL CARE, AND HEATING?: NOT HARD AT ALL

## 2021-10-25 NOTE — PROGRESS NOTES
Vaccine Information Sheet, \"Influenza - Inactivated\"  given to Gabby Sierra, or parent/legal guardian of  Gabby Sierra and verbalized understanding. Patient responses:    Have you ever had a reaction to a flu vaccine? No  Do you have an allergy to eggs, Latex -induced anaphylaxis, neomycin or polymixin? No  Do you have an allergy to Thimerosal, contact lens solution, or Merthiolate? No  Have you ever had Guillian Ulman Syndrome? No  Do you have any current illness? No  Do you have a temperature above 100.4 degrees? No  Are you pregnant? No  If pregnant, permission obtained from physician? No  Do you have an active neurological disorder? No      Flu vaccine given per order. Please see immunization tab.   Immunizations Administered     Name Date Dose Route    Influenza, Quadv, adjuvanted, 65 yrs +, IM, PF (Fluad) 10/25/2021 0.5 mL Intramuscular    Site: Deltoid- Left    Lot: [id number]    NDC: 04366-370-08

## 2021-10-25 NOTE — PATIENT INSTRUCTIONS
Pt to get in touch with [facility] regarding future knee injection. Follow up with Dr. Urban Romero office as planned for Carotid Artery surveillance. Encouraged pt to drink 80 ounces of water daily. Avoid Advil, Motrin, Ibuprofen, Aleve, and Naproxen as these can worsen your kidney function. It is ok for Tylenol 8 Hour- 1-2 pills every 8 hours as needed for pain. After discussion with pt, pt would like to hold on referral to [facility], regular sugar testing, or medication at this time, and instead, continue to work on diet, exercise, and weight loss. Check HGA1C, FLP, BMP, spot MA, free T4/TSH, and CBC in 6-8 months  Continue current medicines   Refills  Follow up in 6-8 months. Preventive Health Topics:  Pt declines HEP C screening at this time due to lack of risk factors. Encouraged COVID VACCINE booster at this time  High-dose flu shot today  Encouraged SHINGLES SHOT [facility])- check with your local pharmacy. (updated 10/25/2021)  Will hold on further PAP/PELVIC EXAMS- S/P LINDSEY/BSO. (updated 10/25/2021)  MAMMO scheduled for 11/11/2021. COLONOSCOPY done 11/14/2015 per Dr. Tommie Weber- to consider in 2025 per Dr. Barakat Postal (updated 10/25/2021)  DEXA done 5/2/2019- essentially stable Osteopenia- to continue Calcium with Vitamin D supplementation and weight bearing exercise- pt declines Fosamax, etc at this time- scheduled for 11/11/20221.  (updated 10/25/2021)  [address] per Dr. Yeni Contreras at this time.   Please call to schedule appt [phone]. (updated 10/25/2021)

## 2021-11-11 ENCOUNTER — HOSPITAL ENCOUNTER (OUTPATIENT)
Dept: WOMENS IMAGING | Age: 81
Discharge: HOME | End: 2021-11-11
Payer: MEDICARE

## 2021-11-11 DIAGNOSIS — Z12.31 ENCOUNTER FOR SCREENING MAMMOGRAM FOR BREAST CANCER: ICD-10-CM

## 2021-11-11 DIAGNOSIS — Z78.0 POSTMENOPAUSAL: ICD-10-CM

## 2021-11-11 DIAGNOSIS — M85.80 OSTEOPENIA, UNSPECIFIED LOCATION: ICD-10-CM

## 2021-11-11 PROCEDURE — 77063 BREAST TOMOSYNTHESIS BI: CPT

## 2021-11-11 PROCEDURE — 77080 DXA BONE DENSITY AXIAL: CPT

## 2021-11-16 ENCOUNTER — TELEPHONE (OUTPATIENT)
Dept: FAMILY MEDICINE CLINIC | Age: 81
End: 2021-11-16

## 2021-11-16 NOTE — TELEPHONE ENCOUNTER
----- Message from MERRILL Mayer CNP sent at 11/12/2021  9:56 AM EST -----  Dexa shows osteopenia. Continue calcium and vitamin D. Would recommend referral to bone fragility clinic.  Thanks, TS

## 2022-03-08 ENCOUNTER — HOSPITAL ENCOUNTER (OUTPATIENT)
Dept: INTERVENTIONAL RADIOLOGY/VASCULAR | Age: 82
Discharge: HOME | End: 2022-03-08
Payer: MEDICARE

## 2022-03-08 DIAGNOSIS — I65.23 BILATERAL CAROTID ARTERY STENOSIS: ICD-10-CM

## 2022-03-08 PROCEDURE — 93880 EXTRACRANIAL BILAT STUDY: CPT

## 2022-03-09 ENCOUNTER — OFFICE VISIT (OUTPATIENT)
Dept: CARDIOTHORACIC SURGERY | Age: 82
End: 2022-03-09
Payer: MEDICARE

## 2022-03-09 VITALS
WEIGHT: 180 LBS | DIASTOLIC BLOOD PRESSURE: 78 MMHG | HEART RATE: 75 BPM | SYSTOLIC BLOOD PRESSURE: 141 MMHG | HEIGHT: 63 IN | BODY MASS INDEX: 31.89 KG/M2

## 2022-03-09 DIAGNOSIS — I65.23 BILATERAL CAROTID ARTERY STENOSIS: Primary | ICD-10-CM

## 2022-03-09 PROCEDURE — 99214 OFFICE O/P EST MOD 30 MIN: CPT | Performed by: THORACIC SURGERY (CARDIOTHORACIC VASCULAR SURGERY)

## 2022-03-09 NOTE — PROGRESS NOTES
CT/CV Surgery Follow Up Office Visit      Patient's Name/Date of Birth: Joss Trevizo / 1940 (18 y.o.)    PCP: Chrissy Carter MD    Date: March 9, 2022    We had the pleasure of seeing Joss Trevizo in the office today, as you know this is a very pleasant 80y.o. year old female with a history of hypertension, hyperlipidemia, and status post right carotid endarterectomy more than 5 years ago after acute CVA. She returned to cardiovascular surgery follow-up. She had a follow-up carotid duplex study yesterday, which showed less than 50% stenosis of bilateral internal carotid artery. She denied any symptoms that may be related to carotid artery stenosis. PastMedical History: Rashaun Espinosa  has a past medical history of Arthritis, Basal cell carcinoma, Carotid artery stenosis, Cataract, Cerebral artery occlusion with cerebral infarction ([address] Hyperlipidemia, Hypertension, Obesity, Osteoarthritis, and PONV (postoperative nausea and vomiting). Past Surgical History:  The patient  has a past surgical history that includes Cholecystectomy (2006); Cataract removal (Bilateral, 3/14); Cosmetic surgery; Colonoscopy (2005); Carotid endarterectomy (Right, 2/19/16); Skin cancer excision (08/29/2016); Breast surgery (Bilateral); Ovary removal (Bilateral); and Hysterectomy. Allergies: The patient is allergic to percocet [oxycodone-acetaminophen], doxycycline hyclate, lipitor [atorvastatin], metronidazole, tape [adhesive tape], betadine [povidone iodine], codeine, and pcn [penicillins].     Medications:    Current Outpatient Medications:     calcium-vitamin D (OSCAL-500) 500-200 MG-UNIT per tablet, Take 1 tablet by mouth daily, Disp: , Rfl:     amLODIPine-benazepril (LOTREL) 5-20 MG per capsule, Take 2 capsules by mouth daily TAKE 2 CAPSULES DAILY, Disp: 180 capsule, Rfl: 3    metoprolol-hydroCHLOROthiazide (LOPRESSOR HCT) 50-25 MG per tablet, Take 1 tablet by mouth 2 times daily, Disp: 180 tablet, Rfl: 3   rosuvastatin (CRESTOR) 20 MG tablet, Take 1 tablet by mouth nightly, Disp: 90 tablet, Rfl: 3    Multiple Vitamins-Minerals (EYE VITAMINS PO), Take by mouth, Disp: , Rfl:     aspirin 81 MG tablet, Take 81 mg by mouth daily, Disp: , Rfl:     mometasone (ELOCON) 0.1 % cream, Apply topically as needed Apply topically daily. , Disp: , Rfl:     acetaminophen (TYLENOL) 500 MG tablet, Take by mouth 2 times daily 2 tab, Disp: , Rfl:     Coenzyme Q10 (CO Q 10 PO), Take by mouth Every other day, Disp: , Rfl:     Family History: This patient's family history includes Breast Cancer (age of onset: 55) in her sister; Diabetes in her father; Heart Disease in her father and mother; High Blood Pressure in her father; Stroke in her mother. Social History: Jamin Leal  reports that she quit smoking about 27 years ago. She started smoking about 62 years ago. She has a 10.00 pack-year smoking history. She has never used smokeless tobacco. She reports current alcohol use. She reports that she does not use drugs. Vital Signs:   BP (!) 141/78 (Site: Right Upper Arm, Position: Sitting, Cuff Size: Medium Adult)   Pulse 75   Ht 5' 3\" (1.6 m)   Wt 180 lb (81.6 kg)   BMI 31.89 kg/m²     ROS:   Constitutional: Negative for activity change, chills, fatigue, fever and unexpected weight change. Respiratory: Negative for sleep apnea, shortness of breath, wheezing, stridor, supplementary home oxygen. Cardiac: Negative for midsternal chest pain, arrhythmia, shortness of breath,  Vascular: Negative for claudication, leg  calf muscle pain, hip pain. Gastrointestinal: Negative for hematochezia, melana, constipation, and N/V/D. Musculoskeletal: Negative for myalgias, amputation. Skin: Negative for color change, rash and wound. Neurological: Old CVA without residual symptoms. Nephrology: Negative for chronic kidney disease,     Physical Exam:  General appearance:  No acute distress, appears stated age and cooperative.   Neck: No jugular venous distention. Trachea midline. No carotid bruits. Right carotid endarterectomy scar well-healed. Chest Wall: No deformity, midsternal scar, enlarged palpable supraclavicular lymphnode. Respiratory:  Normal respiratory effort. Clear to auscultation, bilaterally without Rales/Wheezes/Rhonch. Cardiovascular:  Regular rate and rhythm with normal S1/S2 without murmurs, rubs or gallops. Abdomen: Soft, non-tender, non-distended with normal bowel sounds. Ext: No clubbing, cyanosis or edema bilaterally. No chronic ischemic changes, No varicorsity in lower leg. Skin: Skin color, texture, turgor normal.  No rashes or lesions. No rubor. No venous stasis pigmentation. Neurologic:  Neurovascularly intact without any focal sensory/motor deficits. Peripheral Pulses: +2 palpable femoral pulses bilaterally,    Labs:    CBC:  Lab Results   Component Value Date    WBC 9.3 07/03/2020    HGB 12.5 07/03/2020    HCT 37.3 07/03/2020    MCV 90.1 07/03/2020     07/03/2020    INR 1.01 07/03/2020     BMP:   Lab Results   Component Value Date     10/18/2021    K 3.5 10/18/2021     10/18/2021    CO2 27 10/18/2021    BUN 25 10/18/2021    CREATININE 1.1 10/18/2021       Imaging: I have reviewed the results of carotid duplex study. Problem List:  Patient Active Problem List   Diagnosis    Hyperlipidemia    IFG (impaired fasting glucose)    Osteopenia    Essential hypertension    Non morbid obesity due to excess calories    Stenosis of right carotid artery       Assessment: No recurrent stenosis. Status post right carotid endarterectomy more than 5 years ago. Plan 3/9/22:  1) yearly follow-up with a bilateral carotid duplex study. Thank you for allowing us to be involved in the patient's care.     Electronically by Yoav Berger MD  on 3/9/2022 at 2:01 PM

## 2022-06-13 ENCOUNTER — HOSPITAL ENCOUNTER (OUTPATIENT)
Age: 82
Discharge: HOME | End: 2022-06-13
Payer: MEDICARE

## 2022-06-13 DIAGNOSIS — I10 ESSENTIAL HYPERTENSION: ICD-10-CM

## 2022-06-13 DIAGNOSIS — E11.9 DIET-CONTROLLED DIABETES MELLITUS (HCC): ICD-10-CM

## 2022-06-13 DIAGNOSIS — E78.5 HYPERLIPIDEMIA, UNSPECIFIED HYPERLIPIDEMIA TYPE: ICD-10-CM

## 2022-06-13 DIAGNOSIS — I65.21 STENOSIS OF RIGHT CAROTID ARTERY: ICD-10-CM

## 2022-06-13 DIAGNOSIS — M85.80 OSTEOPENIA, UNSPECIFIED LOCATION: ICD-10-CM

## 2022-06-13 LAB
ANION GAP SERPL CALCULATED.3IONS-SCNC: 12 MEQ/L (ref 8–16)
AVERAGE GLUCOSE: 108 MG/DL (ref 70–126)
BASOPHILS # BLD: 0.9 %
BASOPHILS ABSOLUTE: 0.1 THOU/MM3 (ref 0–0.1)
BUN BLDV-MCNC: 20 MG/DL (ref 7–22)
CALCIUM SERPL-MCNC: 9.8 MG/DL (ref 8.5–10.5)
CHLORIDE BLD-SCNC: 102 MEQ/L (ref 98–111)
CHOLESTEROL, TOTAL: 149 MG/DL (ref 100–199)
CO2: 27 MEQ/L (ref 23–33)
CREAT SERPL-MCNC: 0.9 MG/DL (ref 0.4–1.2)
CREATININE, URINE: 66 MG/DL
EOSINOPHIL # BLD: 4.9 %
EOSINOPHILS ABSOLUTE: 0.3 THOU/MM3 (ref 0–0.4)
ERYTHROCYTE [DISTWIDTH] IN BLOOD BY AUTOMATED COUNT: 17.8 % (ref 11.5–14.5)
ERYTHROCYTE [DISTWIDTH] IN BLOOD BY AUTOMATED COUNT: 57.9 FL (ref 35–45)
GFR SERPL CREATININE-BSD FRML MDRD: 60 ML/MIN/1.73M2
GLUCOSE BLD-MCNC: 100 MG/DL (ref 70–108)
HBA1C MFR BLD: 5.6 % (ref 4.4–6.4)
HCT VFR BLD CALC: 40.2 % (ref 37–47)
HDLC SERPL-MCNC: 53 MG/DL
HEMOGLOBIN: 13.2 GM/DL (ref 12–16)
IMMATURE GRANS (ABS): 0.01 THOU/MM3 (ref 0–0.07)
IMMATURE GRANULOCYTES: 0.2 %
LDL CHOLESTEROL CALCULATED: 61 MG/DL
LYMPHOCYTES # BLD: 22.5 %
LYMPHOCYTES ABSOLUTE: 1.5 THOU/MM3 (ref 1–4.8)
MCH RBC QN AUTO: 29.7 PG (ref 26–33)
MCHC RBC AUTO-ENTMCNC: 32.8 GM/DL (ref 32.2–35.5)
MCV RBC AUTO: 90.5 FL (ref 81–99)
MICROALBUMIN UR-MCNC: < 1.2 MG/DL
MICROALBUMIN/CREAT UR-RTO: 18 MG/G (ref 0–30)
MONOCYTES # BLD: 7.6 %
MONOCYTES ABSOLUTE: 0.5 THOU/MM3 (ref 0.4–1.3)
NUCLEATED RED BLOOD CELLS: 0 /100 WBC
PLATELET # BLD: 213 THOU/MM3 (ref 130–400)
PMV BLD AUTO: 10.6 FL (ref 9.4–12.4)
POTASSIUM SERPL-SCNC: 3.5 MEQ/L (ref 3.5–5.2)
RBC # BLD: 4.44 MILL/MM3 (ref 4.2–5.4)
SEG NEUTROPHILS: 63.9 %
SEGMENTED NEUTROPHILS ABSOLUTE COUNT: 4.2 THOU/MM3 (ref 1.8–7.7)
SODIUM BLD-SCNC: 141 MEQ/L (ref 135–145)
T4 FREE: 1.27 NG/DL (ref 0.93–1.76)
TRIGL SERPL-MCNC: 176 MG/DL (ref 0–199)
TSH SERPL DL<=0.05 MIU/L-ACNC: 1.81 UIU/ML (ref 0.4–4.2)
WBC # BLD: 6.6 THOU/MM3 (ref 4.8–10.8)

## 2022-06-13 PROCEDURE — 83036 HEMOGLOBIN GLYCOSYLATED A1C: CPT

## 2022-06-13 PROCEDURE — 80048 BASIC METABOLIC PNL TOTAL CA: CPT

## 2022-06-13 PROCEDURE — 84443 ASSAY THYROID STIM HORMONE: CPT

## 2022-06-13 PROCEDURE — 84439 ASSAY OF FREE THYROXINE: CPT

## 2022-06-13 PROCEDURE — 80061 LIPID PANEL: CPT

## 2022-06-13 PROCEDURE — 85025 COMPLETE CBC W/AUTO DIFF WBC: CPT

## 2022-06-13 PROCEDURE — 82043 UR ALBUMIN QUANTITATIVE: CPT

## 2022-06-13 PROCEDURE — 36415 COLL VENOUS BLD VENIPUNCTURE: CPT

## 2022-06-22 ENCOUNTER — OFFICE VISIT (OUTPATIENT)
Dept: FAMILY MEDICINE CLINIC | Age: 82
End: 2022-06-22
Payer: MEDICARE

## 2022-06-22 VITALS
RESPIRATION RATE: 16 BRPM | BODY MASS INDEX: 32.7 KG/M2 | HEART RATE: 88 BPM | TEMPERATURE: 98.2 F | SYSTOLIC BLOOD PRESSURE: 124 MMHG | DIASTOLIC BLOOD PRESSURE: 74 MMHG | WEIGHT: 184.6 LBS

## 2022-06-22 DIAGNOSIS — E78.5 HYPERLIPIDEMIA, UNSPECIFIED HYPERLIPIDEMIA TYPE: ICD-10-CM

## 2022-06-22 DIAGNOSIS — N18.30 STAGE 3 CHRONIC KIDNEY DISEASE, UNSPECIFIED WHETHER STAGE 3A OR 3B CKD (HCC): ICD-10-CM

## 2022-06-22 DIAGNOSIS — I10 ESSENTIAL HYPERTENSION: ICD-10-CM

## 2022-06-22 DIAGNOSIS — I65.21 STENOSIS OF RIGHT CAROTID ARTERY: ICD-10-CM

## 2022-06-22 DIAGNOSIS — R73.01 IFG (IMPAIRED FASTING GLUCOSE): Primary | ICD-10-CM

## 2022-06-22 PROCEDURE — 1123F ACP DISCUSS/DSCN MKR DOCD: CPT | Performed by: NURSE PRACTITIONER

## 2022-06-22 PROCEDURE — 99214 OFFICE O/P EST MOD 30 MIN: CPT | Performed by: NURSE PRACTITIONER

## 2022-06-22 ASSESSMENT — PATIENT HEALTH QUESTIONNAIRE - PHQ9
SUM OF ALL RESPONSES TO PHQ QUESTIONS 1-9: 0
SUM OF ALL RESPONSES TO PHQ9 QUESTIONS 1 & 2: 0
SUM OF ALL RESPONSES TO PHQ QUESTIONS 1-9: 0
2. FEELING DOWN, DEPRESSED OR HOPELESS: 0
SUM OF ALL RESPONSES TO PHQ QUESTIONS 1-9: 0
1. LITTLE INTEREST OR PLEASURE IN DOING THINGS: 0
SUM OF ALL RESPONSES TO PHQ QUESTIONS 1-9: 0

## 2022-06-22 NOTE — PROGRESS NOTES
[facility]  [facility]  Dept: [phone]  Dept Fax: [phone]    SUBJECTIVE     Chief Complaint   Patient presents with    6 Month Follow-Up     Pt would like to discuss her cough, and watering eyes. She believes it could be allergies but would like to discuss.  Discuss Labs     Completed 6/13/22. Claudia Pleitez is a [de-identified] y. o.female    Pt presents for follow up of IFG, Hypertension, and Hyperlipidemia. Patient reports that her eyes have been watery and crusty. Was given antibiotic cream by eye doctor without relief. She does have a cough and runny nose as well. Takes coricidin HBP with some relief. Has not tried any otc antihistamines. Started Sunday but had similar symptoms in March as well. Feeling poorly at this time. The home BP readings are monitored intermittently.     Wt Readings from Last 3 Encounters:   06/22/22 184 lb 9.6 oz (83.7 kg)   03/09/22 180 lb (81.6 kg)   10/25/21 185 lb 14.4 oz (84.3 kg)        Patient Active Problem List   Diagnosis    Hyperlipidemia    IFG (impaired fasting glucose)    Osteopenia    Essential hypertension    Non morbid obesity due to excess calories    Stenosis of right carotid artery    Stage 3 chronic kidney disease, unspecified whether stage 3a or 3b CKD (HCC)       Current Outpatient Medications   Medication Sig Dispense Refill    calcium-vitamin D (OSCAL-500) 500-200 MG-UNIT per tablet Take 1 tablet by mouth daily      amLODIPine-benazepril (LOTREL) 5-20 MG per capsule Take 2 capsules by mouth daily TAKE 2 CAPSULES DAILY 180 capsule 3    metoprolol-hydroCHLOROthiazide (LOPRESSOR HCT) 50-25 MG per tablet Take 1 tablet by mouth 2 times daily 180 tablet 3    rosuvastatin (CRESTOR) 20 MG tablet Take 1 tablet by mouth nightly 90 tablet 3    Multiple Vitamins-Minerals (EYE VITAMINS PO) Take by mouth      aspirin 81 MG tablet Take 81 mg by mouth daily      mometasone (ELOCON) 0.1 % cream Apply topically as needed Apply topically daily.  acetaminophen (TYLENOL) 500 MG tablet Take by mouth 2 times daily 2 tab      Coenzyme Q10 (CO Q 10 PO) Take by mouth Every other day       No current facility-administered medications for this visit. Review of Systems   Constitutional: Negative for chills, diaphoresis and fever. HENT: Positive for rhinorrhea. Eyes: Positive for discharge and itching. Respiratory: Positive for cough. Negative for shortness of breath. Cardiovascular: Negative for chest pain, palpitations and leg swelling. Gastrointestinal: Negative for blood in stool, constipation, diarrhea, nausea and vomiting. Genitourinary: Negative for dysuria and hematuria. Musculoskeletal: Negative for myalgias. Neurological: Negative for dizziness and headaches. All other systems reviewed and are negative. OBJECTIVE     /74   Pulse 88   Temp 98.2 °F (36.8 °C) (Oral)   Resp 16   Wt 184 lb 9.6 oz (83.7 kg)   BMI 32.70 kg/m²   Body mass index is 32.7 kg/m². BP Readings from Last 3 Encounters:   06/22/22 124/74   03/09/22 (!) 141/78   10/25/21 124/76     Physical Exam  Vitals and nursing note reviewed. Constitutional:       Appearance: She is well-developed. HENT:      Head: Normocephalic and atraumatic. Right Ear: External ear normal.      Left Ear: External ear normal.      Nose: Nose normal.   Eyes:      Conjunctiva/sclera: Conjunctivae normal.      Pupils: Pupils are equal, round, and reactive to light. Cardiovascular:      Rate and Rhythm: Normal rate and regular rhythm. Heart sounds: Murmur (1-2/6 ANALIA @ LUSB=RUSB) heard. Pulmonary:      Effort: Pulmonary effort is normal.      Breath sounds: Normal breath sounds. Abdominal:      General: Bowel sounds are normal.      Palpations: Abdomen is soft. Musculoskeletal:         General: Normal range of motion. Cervical back: Normal range of motion and neck supple. Skin:     General: Skin is warm and dry. Neurological:      Mental Status: She is alert and oriented to person, place, and time. Deep Tendon Reflexes: Reflexes are normal and symmetric. Psychiatric:         Behavior: Behavior normal.         Thought Content:  Thought content normal.         Judgment: Judgment normal.       Component      Latest Ref Rng & Units 6/13/2022   WBC      4.8 - 10.8 thou/mm3 6.6   RBC      4.20 - 5.40 mill/mm3 4.44   Hemoglobin Quant      12.0 - 16.0 gm/dl 13.2   Hematocrit      37.0 - 47.0 % 40.2   MCV      81.0 - 99.0 fL 90.5   MCH      26.0 - 33.0 pg 29.7   MCHC      32.2 - 35.5 gm/dl 32.8   RDW-CV      11.5 - 14.5 % 17.8 (H)   RDW-SD      35.0 - 45.0 fL 57.9 (H)   Platelet Count      321 - 400 thou/mm3 213   MPV      9.4 - 12.4 fL 10.6   Seg Neutrophils      % 63.9   Lymphocytes      % 22.5   Monocytes      % 7.6   Eosinophils      % 4.9   Basophils      % 0.9   Immature Granulocytes      % 0.2   Segs Absolute      1.8 - 7.7 thou/mm3 4.2   Lymphocytes Absolute      1.0 - 4.8 thou/mm3 1.5   Monocytes Absolute      0.4 - 1.3 thou/mm3 0.5   Eosinophils Absolute      0.0 - 0.4 thou/mm3 0.3   Basophils Absolute      0.0 - 0.1 thou/mm3 0.1   Immature Grans (Abs)      0.00 - 0.07 thou/mm3 0.01   Nucleated Red Blood Cells      /100 wbc 0   Sodium      135 - 145 meq/L 141   Potassium      3.5 - 5.2 meq/L 3.5   Chloride      98 - 111 meq/L 102   CO2      23 - 33 meq/L 27   GLUCOSE, FASTING,GF      70 - 108 mg/dL 100   BUN,BUNPL      7 - 22 mg/dL 20   Creatinine      0.4 - 1.2 mg/dL 0.9   CALCIUM, SERUM, [id number]      8.5 - 10.5 mg/dL 9.8   CHOLESTEROL, TOTAL, [id number]      100 - 199 mg/dL 149   Triglycerides      0 - 199 mg/dL 176   HDL Cholesterol      mg/dL 53   LDL Calculated      mg/dL 61   Microalbumin, Random Urine      mg/dL < 1.20   Creatinine, Urine      mg/dL 66.0   Microalb/Creat Ratio      0 - 30 mg/g 18   Hemoglobin A1C      4.4 - 6.4 % 5.6   AVERAGE GLUCOSE      70 - 126 mg/dL 108   T4 Free      0.93 - 1.76 ng/dL 1.27 TSH      0.400 - 4.200 uIU/mL 1.810   Anion Gap      8.0 - 16.0 meq/L 12.0   Est, Glom Filt Rate      ml/min/1.73m2 60 (A)         Immunization History   Administered Date(s) Administered    COVID-19, Pfizer Purple top, DILUTE for use, 12+ yrs, 30mcg/0.3mL dose 02/26/2021, 03/19/2021, 11/10/2021    Influenza 10/19/2011, 11/21/2012, 12/11/2013    Influenza Virus Vaccine 11/21/2014, 10/30/2015, 10/28/2019    Influenza Whole 10/29/2008, 10/06/2010, 10/01/2014, 10/30/2015    Influenza, High Dose (Fluzone 65 yrs and older) 10/18/2017    Influenza, Quadv, IM, (6 mo and older Fluzone, Flulaval, Fluarix and 3 yrs and older Afluria) 09/19/2016    Influenza, Quadv, adjuvanted, 65 yrs +, IM, PF (Fluad) 10/25/2021    Influenza, Triv, inactivated, subunit, adjuvanted, IM (Fluad 65 yrs and older) 10/02/2018, 10/28/2019    Pneumococcal Conjugate 13-valent ([id number]) 02/27/2015    Pneumococcal Polysaccharide ([id number]) 01/01/2008    Tdap (Boostrix, Adacel) 10/02/2018    Tetanus 10/01/2010    Tetanus Toxoid, absorbed 10/26/2010    Zoster Live (Zostavax) 02/29/2012       Health Maintenance   Topic Date Due    Shingles vaccine (2 of 3) 04/25/2012    Annual Wellness Visit (AWV)  02/24/2022    Lipids  06/13/2023    Depression Screen  06/22/2023    DTaP/Tdap/Td vaccine (2 - Td or Tdap) 10/02/2028    DEXA (modify frequency per FRAX score)  Completed    Flu vaccine  Completed    Pneumococcal 65+ years Vaccine  Completed    COVID-19 Vaccine  Completed    Hepatitis A vaccine  Aged Out    Hib vaccine  Aged Out    Meningococcal (ACWY) vaccine  Aged Out       CT Lung Screen (50-80, 20 pk-yrs, smoking or quit <15 years) indicated at this time?   No, no tobacco for >35 years      Future Appointments   Date Time Provider Janelle Brasdhaw   12/27/2022 10:00 AM MERRILL Hernandez - CNP [facility] Medicine [facility] [address] [facility]   3/2/2023 10:45 AM Bay Srinivasan MD SRPX  RES [facility] - [address] [facility]   3/9/2023 10:00 AM [facility] VASCULAR LAB ROOM 1 STRZ VAS LAB STR Radiolog   3/14/2023 11:00 AM Pan Hook MD [facility] CT/CV P - [address] [facility]         ASSESSMENT       Diagnosis Orders   1. IFG (impaired fasting glucose)  Hemoglobin A1C    Comprehensive Metabolic Panel   2. Stenosis of right carotid artery     3. Hyperlipidemia, unspecified hyperlipidemia type  Comprehensive Metabolic Panel   4. Essential hypertension  Comprehensive Metabolic Panel   5. Stage 3 chronic kidney disease, unspecified whether stage 3a or 3b CKD ([facility] [address]         PLAN         Follow up with Dr. Antonio King office as planned for Carotid Artery surveillance. Encouraged pt to drink 80 ounces of water daily. Avoid Advil, Motrin, Ibuprofen, Aleve, and Naproxen as these can worsen your kidney function. It is ok for Tylenol 8 Hour- 1-2 pills every 8 hours as needed for pain. Recommend trying OTC antihistamine and/or allergy eyedrops  Check HGA1C, CMP in 6 months  Continue current medicines   Refills  Follow up in 6 months. Preventive Health Topics:    Encouraged SHINGLES SHOT [facility])- check with your local pharmacy. (updated 6/26/2022)  Will hold on further PAP/PELVIC EXAMS- S/P LINDSEY/BSO. (updated 6/26/2022)  MAMMO completed 11/11/2021.    COLONOSCOPY done 11/14/2015 per Dr. ERIC Miller 106- to consider in 2025 per Dr. Kathie Arshad (updated 6/26/2022)  DEXA done 11/11/21  - essentially stable Osteopenia- to continue Calcium with Vitamin D supplementation and weight bearing exercise- pt declines Fosamax, etc at this time (updated 6/26/2022)  DILATED EYE EXAM per Dr. Angela Lynn (updated 6/26/2022)          Electronically signed by MERRILL Morgan - CNP on 6/26/2022 at 6:35 PM

## 2022-06-26 ASSESSMENT — ENCOUNTER SYMPTOMS
BLOOD IN STOOL: 0
CONSTIPATION: 0
EYE ITCHING: 1
NAUSEA: 0
DIARRHEA: 0
RHINORRHEA: 1
VOMITING: 0
COUGH: 1
SHORTNESS OF BREATH: 0
EYE DISCHARGE: 1

## 2022-07-05 ENCOUNTER — TELEPHONE (OUTPATIENT)
Dept: FAMILY MEDICINE CLINIC | Age: 82
End: 2022-07-05

## 2022-07-05 RX ORDER — SULFAMETHOXAZOLE AND TRIMETHOPRIM 800; 160 MG/1; MG/1
1 TABLET ORAL 2 TIMES DAILY
Qty: 20 TABLET | Refills: 0 | Status: SHIPPED | OUTPATIENT
Start: 2022-07-05 | End: 2022-07-15

## 2022-07-05 NOTE — TELEPHONE ENCOUNTER
Pt still has cough congestion,no fever, no st, nasal congestion. Pt has had 2 weeks, otc claritin, tylenol. Pt has not gotten any better.   Asking what you recommend     Allgy see chart    [facility] [address]  sharanp

## 2022-07-05 NOTE — TELEPHONE ENCOUNTER
Pt was having cold type symptoms at recent appt. Will send antibiotic to pharmacy since symptoms persist. Please see if patient is following ES to residency. Has appts with both offices scheduled.  Thanks, TS

## 2022-07-25 ENCOUNTER — HOSPITAL ENCOUNTER (OUTPATIENT)
Age: 82
Discharge: HOME | End: 2022-07-25
Payer: MEDICARE

## 2022-07-25 ENCOUNTER — OFFICE VISIT (OUTPATIENT)
Dept: FAMILY MEDICINE CLINIC | Age: 82
End: 2022-07-25
Payer: MEDICARE

## 2022-07-25 VITALS
HEART RATE: 88 BPM | SYSTOLIC BLOOD PRESSURE: 118 MMHG | DIASTOLIC BLOOD PRESSURE: 72 MMHG | BODY MASS INDEX: 31.89 KG/M2 | RESPIRATION RATE: 16 BRPM | WEIGHT: 180 LBS | TEMPERATURE: 97.4 F

## 2022-07-25 DIAGNOSIS — J01.90 ACUTE SINUSITIS, RECURRENCE NOT SPECIFIED, UNSPECIFIED LOCATION: ICD-10-CM

## 2022-07-25 DIAGNOSIS — R05.9 COUGH: ICD-10-CM

## 2022-07-25 DIAGNOSIS — R05.9 COUGH: Primary | ICD-10-CM

## 2022-07-25 LAB
BASOPHILS # BLD: 0.6 %
BASOPHILS ABSOLUTE: 0 THOU/MM3 (ref 0–0.1)
EOSINOPHIL # BLD: 5.1 %
EOSINOPHILS ABSOLUTE: 0.3 THOU/MM3 (ref 0–0.4)
ERYTHROCYTE [DISTWIDTH] IN BLOOD BY AUTOMATED COUNT: 17.5 % (ref 11.5–14.5)
ERYTHROCYTE [DISTWIDTH] IN BLOOD BY AUTOMATED COUNT: 54.7 FL (ref 35–45)
HCT VFR BLD CALC: 36.4 % (ref 37–47)
HEMOGLOBIN: 12.2 GM/DL (ref 12–16)
IMMATURE GRANS (ABS): 0.01 THOU/MM3 (ref 0–0.07)
IMMATURE GRANULOCYTES: 0.2 %
LYMPHOCYTES # BLD: 32.4 %
LYMPHOCYTES ABSOLUTE: 2.1 THOU/MM3 (ref 1–4.8)
MCH RBC QN AUTO: 29.2 PG (ref 26–33)
MCHC RBC AUTO-ENTMCNC: 33.5 GM/DL (ref 32.2–35.5)
MCV RBC AUTO: 87.1 FL (ref 81–99)
MONOCYTES # BLD: 9.5 %
MONOCYTES ABSOLUTE: 0.6 THOU/MM3 (ref 0.4–1.3)
NUCLEATED RED BLOOD CELLS: 0 /100 WBC
PLATELET # BLD: 215 THOU/MM3 (ref 130–400)
PMV BLD AUTO: 10.2 FL (ref 9.4–12.4)
RBC # BLD: 4.18 MILL/MM3 (ref 4.2–5.4)
SEG NEUTROPHILS: 52.2 %
SEGMENTED NEUTROPHILS ABSOLUTE COUNT: 3.4 THOU/MM3 (ref 1.8–7.7)
WBC # BLD: 6.5 THOU/MM3 (ref 4.8–10.8)

## 2022-07-25 PROCEDURE — 99213 OFFICE O/P EST LOW 20 MIN: CPT | Performed by: NURSE PRACTITIONER

## 2022-07-25 PROCEDURE — 1123F ACP DISCUSS/DSCN MKR DOCD: CPT | Performed by: NURSE PRACTITIONER

## 2022-07-25 PROCEDURE — 85025 COMPLETE CBC W/AUTO DIFF WBC: CPT

## 2022-07-25 PROCEDURE — 36415 COLL VENOUS BLD VENIPUNCTURE: CPT

## 2022-07-25 RX ORDER — BENZONATATE 100 MG/1
100 CAPSULE ORAL 3 TIMES DAILY PRN
Qty: 30 CAPSULE | Refills: 0 | Status: SHIPPED | OUTPATIENT
Start: 2022-07-25 | End: 2022-08-04

## 2022-07-25 ASSESSMENT — ENCOUNTER SYMPTOMS
CONSTIPATION: 0
DIARRHEA: 0
VOMITING: 0
NAUSEA: 0
COUGH: 1
BLOOD IN STOOL: 0
SHORTNESS OF BREATH: 0
EYE DISCHARGE: 1

## 2022-07-25 NOTE — PROGRESS NOTES
Chief Complaint   Patient presents with    Cough     Cough and cold symptoms not improving x 2-3 weeks,          SUBJECTIVE     Amber Whitley is a [de-identified] y. o.female      Pt complains of ongoing cough and cold symptoms for the last month despite antibiotics. Patient recently finished Bactrim DS. She feels this helped but did not relieve her symptoms. She continues to have persistent cough and watery eyes. Also notes some nasal congestion, sinus pressure, and runny nose. She is fatigued. Patient reports she felt dizzy while on Bactrim DS but this resolved when she stopped taking medicine. Denies any chest congestion, wheezing, shortness of breath. Took Claritin but it did not help. Review of Systems   Constitutional:  Negative for chills, diaphoresis and fever. Eyes:  Positive for discharge. Respiratory:  Positive for cough. Negative for shortness of breath. Cardiovascular:  Negative for chest pain, palpitations and leg swelling. Gastrointestinal:  Negative for blood in stool, constipation, diarrhea, nausea and vomiting. Genitourinary:  Negative for dysuria and hematuria. Musculoskeletal:  Negative for myalgias. Neurological:  Negative for dizziness and headaches. All other systems reviewed and are negative. OBJECTIVE     /72   Pulse 88   Temp 97.4 °F (36.3 °C) (Oral)   Resp 16   Wt 180 lb (81.6 kg)   BMI 31.89 kg/m²     Physical Exam  Vitals and nursing note reviewed. Constitutional:       Appearance: She is well-developed. HENT:      Head: Normocephalic and atraumatic. Right Ear: External ear normal.      Left Ear: External ear normal.      Nose: Nose normal.   Eyes:      Conjunctiva/sclera: Conjunctivae normal.      Pupils: Pupils are equal, round, and reactive to light. Cardiovascular:      Rate and Rhythm: Normal rate and regular rhythm. Heart sounds: Normal heart sounds.    Pulmonary:      Effort: Pulmonary effort is normal.      Breath sounds: Normal breath

## 2022-07-25 NOTE — PROGRESS NOTES
Left ear flush performed per orders of TS with warm water and a cerumen spoon. Small amount of wax removed. Tympanic membrane visualized as pearly white and intact. Pt tolerated well.

## 2022-11-09 DIAGNOSIS — E78.5 HYPERLIPIDEMIA, UNSPECIFIED HYPERLIPIDEMIA TYPE: ICD-10-CM

## 2022-11-09 NOTE — TELEPHONE ENCOUNTER
This medication refill is regarding a electronic request. Refill requested by  [facility] . Requested Prescriptions     Pending Prescriptions Disp Refills    rosuvastatin (CRESTOR) 20 MG tablet [Pharmacy Med Name: ROSUVASTATIN TABS 20MG] 90 tablet 3     Sig: TAKE 1 TABLET NIGHTLY     Date of last visit: 7/25/2022   Date of next visit: 12/27/2022  Date of last refill: 10/25/21 #90/3    Last Lipid Panel:    Lab Results   Component Value Date/Time    CHOL 149 06/13/2022 09:30 AM    TRIG 176 06/13/2022 09:30 AM    HDL 53 06/13/2022 09:30 AM    HDL 58 05/03/2012 12:00 AM    LDLCALC 61 06/13/2022 09:30 AM     Last CMP:   Lab Results   Component Value Date     06/13/2022    K 3.5 06/13/2022     06/13/2022    CO2 27 06/13/2022    BUN 20 06/13/2022    CREATININE 0.9 06/13/2022    GLUCOSE 100 06/13/2022    CALCIUM 9.8 06/13/2022    PROT 7.1 10/18/2021    LABALBU 4.5 10/18/2021    BILITOT 0.4 10/18/2021    ALKPHOS 65 10/18/2021    AST 17 10/18/2021    ALT 12 10/18/2021    LABGLOM 60 (A) 06/13/2022     Rx verified, ordered and set to EP.

## 2022-11-10 RX ORDER — ROSUVASTATIN CALCIUM 20 MG/1
TABLET, COATED ORAL
Qty: 90 TABLET | Refills: 3 | Status: SHIPPED | OUTPATIENT
Start: 2022-11-10

## 2022-12-19 ENCOUNTER — NURSE ONLY (OUTPATIENT)
Dept: LAB | Age: 82
End: 2022-12-19

## 2022-12-19 DIAGNOSIS — E78.5 HYPERLIPIDEMIA, UNSPECIFIED HYPERLIPIDEMIA TYPE: ICD-10-CM

## 2022-12-19 DIAGNOSIS — I10 ESSENTIAL HYPERTENSION: ICD-10-CM

## 2022-12-19 DIAGNOSIS — R73.01 IFG (IMPAIRED FASTING GLUCOSE): ICD-10-CM

## 2022-12-19 LAB
ALBUMIN SERPL-MCNC: 4.2 G/DL (ref 3.5–5.1)
ALP BLD-CCNC: 76 U/L (ref 38–126)
ALT SERPL-CCNC: 11 U/L (ref 11–66)
ANION GAP SERPL CALCULATED.3IONS-SCNC: 13 MEQ/L (ref 8–16)
AST SERPL-CCNC: 18 U/L (ref 5–40)
AVERAGE GLUCOSE: 126 MG/DL (ref 70–126)
BILIRUB SERPL-MCNC: 0.3 MG/DL (ref 0.3–1.2)
BUN BLDV-MCNC: 18 MG/DL (ref 7–22)
CALCIUM SERPL-MCNC: 9.8 MG/DL (ref 8.5–10.5)
CHLORIDE BLD-SCNC: 101 MEQ/L (ref 98–111)
CO2: 30 MEQ/L (ref 23–33)
CREAT SERPL-MCNC: 0.8 MG/DL (ref 0.4–1.2)
GFR SERPL CREATININE-BSD FRML MDRD: > 60 ML/MIN/1.73M2
GLUCOSE BLD-MCNC: 107 MG/DL (ref 70–108)
HBA1C MFR BLD: 6.2 % (ref 4.4–6.4)
POTASSIUM SERPL-SCNC: 3.3 MEQ/L (ref 3.5–5.2)
SODIUM BLD-SCNC: 144 MEQ/L (ref 135–145)
TOTAL PROTEIN: 6.5 G/DL (ref 6.1–8)

## 2023-01-03 ENCOUNTER — OFFICE VISIT (OUTPATIENT)
Dept: FAMILY MEDICINE CLINIC | Age: 83
End: 2023-01-03

## 2023-01-03 VITALS
DIASTOLIC BLOOD PRESSURE: 68 MMHG | BODY MASS INDEX: 32.03 KG/M2 | SYSTOLIC BLOOD PRESSURE: 124 MMHG | RESPIRATION RATE: 16 BRPM | HEART RATE: 68 BPM | TEMPERATURE: 97.5 F | WEIGHT: 180.8 LBS

## 2023-01-03 DIAGNOSIS — E66.09 NON MORBID OBESITY DUE TO EXCESS CALORIES: ICD-10-CM

## 2023-01-03 DIAGNOSIS — I10 ESSENTIAL HYPERTENSION: Primary | ICD-10-CM

## 2023-01-03 DIAGNOSIS — Z12.31 SCREENING MAMMOGRAM FOR BREAST CANCER: ICD-10-CM

## 2023-01-03 DIAGNOSIS — M85.80 OSTEOPENIA, UNSPECIFIED LOCATION: ICD-10-CM

## 2023-01-03 DIAGNOSIS — E87.6 HYPOKALEMIA: ICD-10-CM

## 2023-01-03 DIAGNOSIS — N18.30 STAGE 3 CHRONIC KIDNEY DISEASE, UNSPECIFIED WHETHER STAGE 3A OR 3B CKD (HCC): ICD-10-CM

## 2023-01-03 DIAGNOSIS — E78.5 HYPERLIPIDEMIA, UNSPECIFIED HYPERLIPIDEMIA TYPE: ICD-10-CM

## 2023-01-03 DIAGNOSIS — R73.01 IFG (IMPAIRED FASTING GLUCOSE): ICD-10-CM

## 2023-01-03 RX ORDER — METOPROLOL TARTRATE AND HYDROCHLOROTHIAZIDE 50; 25 MG/1; MG/1
1 TABLET ORAL 2 TIMES DAILY
Qty: 180 TABLET | Refills: 3 | Status: SHIPPED | OUTPATIENT
Start: 2023-01-03

## 2023-01-03 RX ORDER — AMLODIPINE BESYLATE AND BENAZEPRIL HYDROCHLORIDE 5; 20 MG/1; MG/1
2 CAPSULE ORAL DAILY
Qty: 180 CAPSULE | Refills: 3 | Status: SHIPPED | OUTPATIENT
Start: 2023-01-03

## 2023-01-03 SDOH — ECONOMIC STABILITY: FOOD INSECURITY: WITHIN THE PAST 12 MONTHS, YOU WORRIED THAT YOUR FOOD WOULD RUN OUT BEFORE YOU GOT MONEY TO BUY MORE.: NEVER TRUE

## 2023-01-03 SDOH — ECONOMIC STABILITY: FOOD INSECURITY: WITHIN THE PAST 12 MONTHS, THE FOOD YOU BOUGHT JUST DIDN'T LAST AND YOU DIDN'T HAVE MONEY TO GET MORE.: NEVER TRUE

## 2023-01-03 ASSESSMENT — PATIENT HEALTH QUESTIONNAIRE - PHQ9
2. FEELING DOWN, DEPRESSED OR HOPELESS: 0
SUM OF ALL RESPONSES TO PHQ QUESTIONS 1-9: 0
SUM OF ALL RESPONSES TO PHQ QUESTIONS 1-9: 0
1. LITTLE INTEREST OR PLEASURE IN DOING THINGS: 0
SUM OF ALL RESPONSES TO PHQ9 QUESTIONS 1 & 2: 0
SUM OF ALL RESPONSES TO PHQ QUESTIONS 1-9: 0
SUM OF ALL RESPONSES TO PHQ QUESTIONS 1-9: 0

## 2023-01-03 ASSESSMENT — SOCIAL DETERMINANTS OF HEALTH (SDOH): HOW HARD IS IT FOR YOU TO PAY FOR THE VERY BASICS LIKE FOOD, HOUSING, MEDICAL CARE, AND HEATING?: NOT HARD AT ALL

## 2023-01-03 NOTE — PROGRESS NOTES
[facility]  [facility]  Dept: [phone]  Dept Fax: [phone]    SUBJECTIVE     Chief Complaint   Patient presents with    6 Month Follow-Up     HTN, hyperlipids, and IFG. Labs done, results in epic. No concerns. Vinita Toledo is a [de-identified] y. o.female    Pt presents for follow up of IFG, Hypertension, and Hyperlipidemia. Patient currently has a cold -notes some right sided sore throat and sinus congestion. This been going on for a couple weeks at this point. Patient states it is starting to improve. The home BP readings are monitored intermittently. Wt Readings from Last 3 Encounters:   01/03/23 180 lb 12.8 oz (82 kg)   07/25/22 180 lb (81.6 kg)   06/22/22 184 lb 9.6 oz (83.7 kg)        Patient Active Problem List   Diagnosis    Hyperlipidemia    IFG (impaired fasting glucose)    Osteopenia    Essential hypertension    Non morbid obesity due to excess calories    Stenosis of right carotid artery    Stage 3 chronic kidney disease, unspecified whether stage 3a or 3b CKD (HCC)       Current Outpatient Medications   Medication Sig Dispense Refill    amLODIPine-benazepril (LOTREL) 5-20 MG per capsule Take 2 capsules by mouth daily TAKE 2 CAPSULES DAILY 180 capsule 3    metoprolol-hydroCHLOROthiazide (LOPRESSOR HCT) 50-25 MG per tablet Take 1 tablet by mouth 2 times daily 180 tablet 3    rosuvastatin (CRESTOR) 20 MG tablet TAKE 1 TABLET NIGHTLY 90 tablet 3    psyllium (KONSYL) 28.3 % PACK Take 1 packet by mouth in the morning. calcium-vitamin D (OSCAL-500) 500-200 MG-UNIT per tablet Take 1 tablet by mouth daily      Multiple Vitamins-Minerals (EYE VITAMINS PO) Take by mouth      aspirin 81 MG tablet Take 81 mg by mouth daily      acetaminophen (TYLENOL) 500 MG tablet Take by mouth 2 times daily 2 tab      Coenzyme Q10 (CO Q 10 PO) Take by mouth Every other day       No current facility-administered medications for this visit.      Review of Systems   Constitutional: Negative for chills, diaphoresis and fever. HENT:  Positive for congestion, rhinorrhea and sore throat. Respiratory:  Negative for shortness of breath. Cardiovascular:  Negative for chest pain, palpitations and leg swelling. Gastrointestinal:  Negative for blood in stool, constipation, diarrhea, nausea and vomiting. Genitourinary:  Negative for dysuria and hematuria. Musculoskeletal:  Negative for myalgias. Neurological:  Negative for dizziness and headaches. All other systems reviewed and are negative. OBJECTIVE     /68   Pulse 68   Temp 97.5 °F (36.4 °C) (Oral)   Resp 16   Wt 180 lb 12.8 oz (82 kg)   BMI 32.03 kg/m²   Body mass index is 32.03 kg/m². BP Readings from Last 3 Encounters:   01/03/23 124/68   07/25/22 118/72   06/22/22 124/74     Physical Exam  Vitals and nursing note reviewed. Constitutional:       Appearance: She is well-developed. HENT:      Head: Normocephalic and atraumatic. Right Ear: External ear normal.      Left Ear: External ear normal.      Nose: Nose normal.   Eyes:      Conjunctiva/sclera: Conjunctivae normal.      Pupils: Pupils are equal, round, and reactive to light. Cardiovascular:      Rate and Rhythm: Normal rate and regular rhythm. Heart sounds: Murmur (1-2/6 ANALIA @ LUSB=RUSB) heard. Pulmonary:      Effort: Pulmonary effort is normal.      Breath sounds: Normal breath sounds. Abdominal:      General: Bowel sounds are normal.      Palpations: Abdomen is soft. Musculoskeletal:         General: Normal range of motion. Cervical back: Normal range of motion and neck supple. Skin:     General: Skin is warm and dry. Neurological:      Mental Status: She is alert and oriented to person, place, and time. Deep Tendon Reflexes: Reflexes are normal and symmetric. Psychiatric:         Behavior: Behavior normal.         Thought Content:  Thought content normal.         Judgment: Judgment normal.     Glucose, Random      70 - 108 mg/dL 107   Creatinine      0.4 - 1.2 mg/dL 0.8   BUN,BUNPL      7 - 22 mg/dL 18   Sodium      135 - 145 meq/L 144   Potassium      3.5 - 5.2 meq/L 3.3 (L)   Chloride      98 - 111 meq/L 101   CO2      23 - 33 meq/L 30   CALCIUM, SERUM, [id number]      8.5 - 10.5 mg/dL 9.8   AST      5 - 40 U/L 18   Alk Phos      38 - 126 U/L 76   Total Protein      6.1 - 8.0 g/dL 6.5   Albumin      3.5 - 5.1 g/dL 4.2   Bilirubin      0.3 - 1.2 mg/dL 0.3   ALT      11 - 66 U/L 11   Hemoglobin A1C      4.4 - 6.4 % 6.2   AVERAGE GLUCOSE      70 - 126 mg/dL 126   Anion Gap      8.0 - 16.0 meq/L 13.0   Est, Glom Filt Rate      >60 ml/min/1.73m2 >60         Immunization History   Administered Date(s) Administered    COVID-19, PFIZER PURPLE top, DILUTE for use, (age 15 y+), 30mcg/0.3mL 02/26/2021, 03/19/2021, 11/10/2021    Influenza 10/19/2011, 11/21/2012, 12/11/2013    Influenza Virus Vaccine 11/21/2014, 10/30/2015, 10/28/2019    Influenza Whole 10/29/2008, 10/06/2010, 10/01/2014, 10/30/2015    Influenza, AFLURIA (age 1 yrs+), FLUZONE, (age 10 mo+), MDV, 0.5mL 09/19/2016    Influenza, FLUAD, (age 72 y+), Adjuvanted, 0.5mL 10/25/2021    Influenza, High Dose (Fluzone 65 yrs and older) 10/18/2017    Influenza, Triv, inactivated, subunit, adjuvanted, IM (Fluad 65 yrs and older) 10/02/2018, 10/28/2019    Pneumococcal Conjugate 13-valent ([id number]) 02/27/2015    Pneumococcal Polysaccharide ([id number]) 01/01/2008    Tdap (Boostrix, Adacel) 10/02/2018    Tetanus 10/01/2010    Tetanus Toxoid, absorbed 10/26/2010    Zoster Live (Zostavax) 02/29/2012       Health Maintenance   Topic Date Due    Shingles vaccine (2 of 3) 04/25/2012    COVID-19 Vaccine (4 - Booster for [employer] series) 01/05/2022    Annual Wellness Visit (AWV)  02/24/2022    Lipids  06/13/2023    Depression Screen  01/03/2024    DTaP/Tdap/Td vaccine (2 - Td or Tdap) 10/02/2028    DEXA (modify frequency per FRAX score)  Completed    Flu vaccine  Completed    Pneumococcal 65+ years Vaccine Completed    Hepatitis A vaccine  Aged Out    Hib vaccine  Aged Out    Meningococcal (ACWY) vaccine  Aged Out       CT Lung Screen (50-80, 20 pk-yrs, smoking or quit <15 years) indicated at this time? No, no tobacco for >35 years      Future Appointments   Date Time Provider Janelle Leeann   3/9/2023 10:00 AM STR VASCULAR LAB ROOM 1 STRZ VAS LAB STR Radiolog   3/14/2023 11:00 AM Sharon Hester MD N SRPX CT/CV P - [place]   7/10/2023 10:20 AM MERRILL Spann - CNP [facility] Medicine [facility] - [place]         ASSESSMENT       Diagnosis Orders   1. Essential hypertension  amLODIPine-benazepril (LOTREL) 5-20 MG per capsule    metoprolol-hydroCHLOROthiazide (LOPRESSOR HCT) 50-25 MG per tablet    CBC with Auto Differential    Basic Metabolic Panel    Lipid Panel    T4, Free    TSH    Microalbumin / Creatinine Urine Ratio      2. Stage 3 chronic kidney disease, unspecified whether stage 3a or 3b CKD (HCC)  CBC with Auto Differential    Basic Metabolic Panel    T4, Free    TSH    Microalbumin / Creatinine Urine Ratio      3. Hyperlipidemia, unspecified hyperlipidemia type  CBC with Auto Differential    Basic Metabolic Panel    Lipid Panel      4. IFG (impaired fasting glucose)  CBC with Auto Differential    Hemoglobin V0I    Basic Metabolic Panel    Lipid Panel    T4, Free    TSH    Microalbumin / Creatinine Urine Ratio      5. Non morbid obesity due to excess calories  CBC with Auto Differential    Basic Metabolic Panel      6. Osteopenia, unspecified location  CBC with Auto Differential    Basic Metabolic Panel      7. Hypokalemia  Potassium      8.  Screening mammogram for breast cancer  RAUDEL DIGITAL SCREEN W OR WO CAD BILATERAL          PLAN         Orders Placed This Encounter   Procedures    RAUDEL DIGITAL SCREEN W OR WO CAD BILATERAL     Standing Status:   Future     Standing Expiration Date:   1/3/2024     Order Specific Question:   Reason for exam:     Answer:   screening    CBC with Auto Differential     Standing Status: Future     Standing Expiration Date:   1/3/2024    Hemoglobin A1C     Standing Status:   Future     Standing Expiration Date:   4/5/7167    Basic Metabolic Panel     Standing Status:   Future     Standing Expiration Date:   1/3/2024    Lipid Panel     Standing Status:   Future     Standing Expiration Date:   1/3/2024     Order Specific Question:   Is Patient Fasting?/# of Hours     Answer:   yes/12    T4, Free     Standing Status:   Future     Standing Expiration Date:   1/3/2024    TSH     Standing Status:   Future     Standing Expiration Date:   1/3/2024    Microalbumin / Creatinine Urine Ratio     Standing Status:   Future     Standing Expiration Date:   1/3/2024    Potassium     Standing Status:   Future     Standing Expiration Date:   1/3/2024       Follow up with Dr. Allyson Mclaughlin office as planned for Carotid Artery surveillance. Carotid doppler ordered for March 2023  Encouraged pt to drink 80 ounces of water daily. Avoid Advil, Motrin, Ibuprofen, Aleve, and Naproxen as these can worsen your kidney function. It is ok for Tylenol 8 Hour- 1-2 pills every 8 hours as needed for pain. Patient declines antibiotic at this time as symptoms do seem to be improving  Check HGA1C, BMP, FLP, TSH, Free T4, Spot MA, CBC in 6 months  Potassium level in 1-2 weeks  Continue current medicines   Refills  Follow up in 6 months. Preventive Health Topics:    Encouraged SHINGLES SHOT [facility])- check with your local pharmacy. (updated 1/8/2023)  Will hold on further PAP/PELVIC EXAMS- S/P LINDSEY/BSO. (updated 1/8/2023)  MAMMO completed 11/11/2021. New order provided.   COLONOSCOPY done 11/14/2015 per Dr. Nadia Roy- to consider in 2025 per Dr. Nadia Roy  (updated 1/8/2023)  DEXA done 11/11/21- essentially stable Osteopenia- to continue Calcium with Vitamin D supplementation and weight bearing exercise- pt declines Fosamax, etc at this time (updated 1/8/2023)  [address] per Dr. Sarha Silva (updated 1/8/2023)          Electronically signed by MERRILL Dow - CNP on 1/8/2023 at 8:14 PM

## 2023-01-08 ASSESSMENT — ENCOUNTER SYMPTOMS
VOMITING: 0
DIARRHEA: 0
NAUSEA: 0
RHINORRHEA: 1
BLOOD IN STOOL: 0
SORE THROAT: 1
SHORTNESS OF BREATH: 0
CONSTIPATION: 0

## 2023-01-17 ENCOUNTER — NURSE ONLY (OUTPATIENT)
Dept: LAB | Age: 83
End: 2023-01-17

## 2023-01-17 DIAGNOSIS — E87.6 HYPOKALEMIA: ICD-10-CM

## 2023-01-17 LAB — POTASSIUM SERPL-SCNC: 3.6 MEQ/L (ref 3.5–5.2)

## 2023-03-07 DIAGNOSIS — I65.23 BILATERAL CAROTID ARTERY STENOSIS: Primary | ICD-10-CM

## 2023-03-22 ENCOUNTER — HOSPITAL ENCOUNTER (OUTPATIENT)
Dept: ULTRASOUND IMAGING | Age: 83
Discharge: HOME | End: 2023-03-22
Payer: MEDICARE

## 2023-03-22 DIAGNOSIS — I65.23 BILATERAL CAROTID ARTERY STENOSIS: ICD-10-CM

## 2023-03-22 PROCEDURE — 93880 EXTRACRANIAL BILAT STUDY: CPT

## 2023-04-04 ENCOUNTER — OFFICE VISIT (OUTPATIENT)
Dept: CARDIOTHORACIC SURGERY | Age: 83
End: 2023-04-04
Payer: MEDICARE

## 2023-04-04 VITALS
SYSTOLIC BLOOD PRESSURE: 148 MMHG | HEART RATE: 68 BPM | BODY MASS INDEX: 32.62 KG/M2 | WEIGHT: 184.1 LBS | DIASTOLIC BLOOD PRESSURE: 90 MMHG | HEIGHT: 63 IN

## 2023-04-04 DIAGNOSIS — I65.23 BILATERAL CAROTID ARTERY STENOSIS: Primary | ICD-10-CM

## 2023-04-04 PROCEDURE — 1123F ACP DISCUSS/DSCN MKR DOCD: CPT | Performed by: THORACIC SURGERY (CARDIOTHORACIC VASCULAR SURGERY)

## 2023-04-04 PROCEDURE — 3077F SYST BP >= 140 MM HG: CPT | Performed by: THORACIC SURGERY (CARDIOTHORACIC VASCULAR SURGERY)

## 2023-04-04 PROCEDURE — 3080F DIAST BP >= 90 MM HG: CPT | Performed by: THORACIC SURGERY (CARDIOTHORACIC VASCULAR SURGERY)

## 2023-04-04 PROCEDURE — 99214 OFFICE O/P EST MOD 30 MIN: CPT | Performed by: THORACIC SURGERY (CARDIOTHORACIC VASCULAR SURGERY)

## 2023-04-04 NOTE — PROGRESS NOTES
continue Crestor and aspirin. 2) follow-up in 1 year with bilateral carotid duplex study. Thank you for allowing us to be involved in the patient's care.     Electronically by Paulette Glover MD  on 4/4/2023 at 9:58 AM

## 2023-07-26 ENCOUNTER — NURSE ONLY (OUTPATIENT)
Dept: LAB | Age: 83
End: 2023-07-26

## 2023-07-26 DIAGNOSIS — E66.09 NON MORBID OBESITY DUE TO EXCESS CALORIES: ICD-10-CM

## 2023-07-26 DIAGNOSIS — I10 ESSENTIAL HYPERTENSION: ICD-10-CM

## 2023-07-26 DIAGNOSIS — R73.01 IFG (IMPAIRED FASTING GLUCOSE): ICD-10-CM

## 2023-07-26 DIAGNOSIS — M85.80 OSTEOPENIA, UNSPECIFIED LOCATION: ICD-10-CM

## 2023-07-26 DIAGNOSIS — E78.5 HYPERLIPIDEMIA, UNSPECIFIED HYPERLIPIDEMIA TYPE: ICD-10-CM

## 2023-07-26 DIAGNOSIS — N18.30 STAGE 3 CHRONIC KIDNEY DISEASE, UNSPECIFIED WHETHER STAGE 3A OR 3B CKD (HCC): ICD-10-CM

## 2023-07-26 LAB
ANION GAP SERPL CALC-SCNC: 11 MEQ/L (ref 8–16)
BASOPHILS ABSOLUTE: 0 THOU/MM3 (ref 0–0.1)
BASOPHILS NFR BLD AUTO: 0.5 %
BUN SERPL-MCNC: 20 MG/DL (ref 7–22)
CALCIUM SERPL-MCNC: 9.8 MG/DL (ref 8.5–10.5)
CHLORIDE SERPL-SCNC: 103 MEQ/L (ref 98–111)
CHOLEST SERPL-MCNC: 144 MG/DL (ref 100–199)
CO2 SERPL-SCNC: 28 MEQ/L (ref 23–33)
CREAT SERPL-MCNC: 0.9 MG/DL (ref 0.4–1.2)
CREAT UR-MCNC: 41.2 MG/DL
DEPRECATED MEAN GLUCOSE BLD GHB EST-ACNC: 117 MG/DL (ref 70–126)
DEPRECATED RDW RBC AUTO: 57.7 FL (ref 35–45)
EOSINOPHIL NFR BLD AUTO: 1.2 %
EOSINOPHILS ABSOLUTE: 0.1 THOU/MM3 (ref 0–0.4)
ERYTHROCYTE [DISTWIDTH] IN BLOOD BY AUTOMATED COUNT: 17.4 % (ref 11.5–14.5)
GFR SERPL CREATININE-BSD FRML MDRD: > 60 ML/MIN/1.73M2
GLUCOSE SERPL-MCNC: 119 MG/DL (ref 70–108)
HBA1C MFR BLD HPLC: 5.9 % (ref 4.4–6.4)
HCT VFR BLD AUTO: 38.3 % (ref 37–47)
HDLC SERPL-MCNC: 61 MG/DL
HGB BLD-MCNC: 12.5 GM/DL (ref 12–16)
IMM GRANULOCYTES # BLD AUTO: 0.03 THOU/MM3 (ref 0–0.07)
IMM GRANULOCYTES NFR BLD AUTO: 0.4 %
LDLC SERPL CALC-MCNC: 66 MG/DL
LYMPHOCYTES ABSOLUTE: 1.5 THOU/MM3 (ref 1–4.8)
LYMPHOCYTES NFR BLD AUTO: 17.7 %
MCH RBC QN AUTO: 29.4 PG (ref 26–33)
MCHC RBC AUTO-ENTMCNC: 32.6 GM/DL (ref 32.2–35.5)
MCV RBC AUTO: 90.1 FL (ref 81–99)
MICROALBUMIN UR-MCNC: < 1.2 MG/DL
MICROALBUMIN/CREAT RATIO PNL UR: 29 MG/G (ref 0–30)
MONOCYTES ABSOLUTE: 0.5 THOU/MM3 (ref 0.4–1.3)
MONOCYTES NFR BLD AUTO: 6.1 %
NEUTROPHILS NFR BLD AUTO: 74.1 %
NRBC BLD AUTO-RTO: 0 /100 WBC
PLATELET # BLD AUTO: 208 THOU/MM3 (ref 130–400)
PMV BLD AUTO: 10.4 FL (ref 9.4–12.4)
POTASSIUM SERPL-SCNC: 3.7 MEQ/L (ref 3.5–5.2)
RBC # BLD AUTO: 4.25 MILL/MM3 (ref 4.2–5.4)
SEGMENTED NEUTROPHILS ABSOLUTE COUNT: 6.2 THOU/MM3 (ref 1.8–7.7)
SODIUM SERPL-SCNC: 142 MEQ/L (ref 135–145)
T4 FREE SERPL-MCNC: 1.42 NG/DL (ref 0.93–1.76)
TRIGL SERPL-MCNC: 83 MG/DL (ref 0–199)
TSH SERPL DL<=0.005 MIU/L-ACNC: 1.09 UIU/ML (ref 0.4–4.2)
WBC # BLD AUTO: 8.4 THOU/MM3 (ref 4.8–10.8)

## 2023-07-31 ENCOUNTER — OFFICE VISIT (OUTPATIENT)
Dept: FAMILY MEDICINE CLINIC | Age: 83
End: 2023-07-31
Payer: MEDICARE

## 2023-07-31 VITALS
WEIGHT: 181.4 LBS | BODY MASS INDEX: 32.14 KG/M2 | HEART RATE: 64 BPM | SYSTOLIC BLOOD PRESSURE: 118 MMHG | RESPIRATION RATE: 16 BRPM | TEMPERATURE: 97.6 F | DIASTOLIC BLOOD PRESSURE: 74 MMHG | HEIGHT: 63 IN

## 2023-07-31 DIAGNOSIS — Z78.0 POST-MENOPAUSE: ICD-10-CM

## 2023-07-31 DIAGNOSIS — Z23 NEED FOR SHINGLES VACCINE: ICD-10-CM

## 2023-07-31 DIAGNOSIS — M85.80 OSTEOPENIA, UNSPECIFIED LOCATION: ICD-10-CM

## 2023-07-31 DIAGNOSIS — Z00.00 MEDICARE ANNUAL WELLNESS VISIT, SUBSEQUENT: Primary | ICD-10-CM

## 2023-07-31 DIAGNOSIS — Z85.828 HX OF SKIN CANCER, BASAL CELL: Primary | ICD-10-CM

## 2023-07-31 PROCEDURE — 1123F ACP DISCUSS/DSCN MKR DOCD: CPT | Performed by: NURSE PRACTITIONER

## 2023-07-31 PROCEDURE — G0439 PPPS, SUBSEQ VISIT: HCPCS | Performed by: NURSE PRACTITIONER

## 2023-07-31 PROCEDURE — 3074F SYST BP LT 130 MM HG: CPT | Performed by: NURSE PRACTITIONER

## 2023-07-31 PROCEDURE — 3078F DIAST BP <80 MM HG: CPT | Performed by: NURSE PRACTITIONER

## 2023-07-31 RX ORDER — ZOSTER VACCINE RECOMBINANT, ADJUVANTED 50 MCG/0.5
0.5 KIT INTRAMUSCULAR SEE ADMIN INSTRUCTIONS
Qty: 0.5 ML | Refills: 0 | Status: SHIPPED | OUTPATIENT
Start: 2023-07-31 | End: 2024-01-27

## 2023-07-31 SDOH — ECONOMIC STABILITY: HOUSING INSECURITY
IN THE LAST 12 MONTHS, WAS THERE A TIME WHEN YOU DID NOT HAVE A STEADY PLACE TO SLEEP OR SLEPT IN A SHELTER (INCLUDING NOW)?: NO

## 2023-07-31 SDOH — ECONOMIC STABILITY: FOOD INSECURITY: WITHIN THE PAST 12 MONTHS, YOU WORRIED THAT YOUR FOOD WOULD RUN OUT BEFORE YOU GOT MONEY TO BUY MORE.: NEVER TRUE

## 2023-07-31 SDOH — ECONOMIC STABILITY: FOOD INSECURITY: WITHIN THE PAST 12 MONTHS, THE FOOD YOU BOUGHT JUST DIDN'T LAST AND YOU DIDN'T HAVE MONEY TO GET MORE.: NEVER TRUE

## 2023-07-31 SDOH — ECONOMIC STABILITY: INCOME INSECURITY: HOW HARD IS IT FOR YOU TO PAY FOR THE VERY BASICS LIKE FOOD, HOUSING, MEDICAL CARE, AND HEATING?: NOT HARD AT ALL

## 2023-07-31 ASSESSMENT — PATIENT HEALTH QUESTIONNAIRE - PHQ9
SUM OF ALL RESPONSES TO PHQ QUESTIONS 1-9: 1
1. LITTLE INTEREST OR PLEASURE IN DOING THINGS: 1
SUM OF ALL RESPONSES TO PHQ QUESTIONS 1-9: 1
2. FEELING DOWN, DEPRESSED OR HOPELESS: 0
SUM OF ALL RESPONSES TO PHQ QUESTIONS 1-9: 1
SUM OF ALL RESPONSES TO PHQ QUESTIONS 1-9: 1
SUM OF ALL RESPONSES TO PHQ9 QUESTIONS 1 & 2: 1

## 2023-07-31 ASSESSMENT — LIFESTYLE VARIABLES
HOW MANY STANDARD DRINKS CONTAINING ALCOHOL DO YOU HAVE ON A TYPICAL DAY: 1 OR 2
HOW OFTEN DO YOU HAVE A DRINK CONTAINING ALCOHOL: MONTHLY OR LESS

## 2023-07-31 NOTE — PROGRESS NOTES
Medicare Annual Wellness Visit    Loan Irizarry is here for Medicare AWV (Here for AWV and check up for chronic conditions. /[place], [place] [address] [address] )    Saman Blackburn was seen today for medicare awv. Diagnoses and all orders for this visit:    Medicare annual wellness visit, subsequent      Recommendations for Preventive Services Due: see orders and patient instructions/AVS.  Recommended screening schedule for the next 5-10 years is provided to the patient in written form: see Patient Instructions/AVS.     No follow-ups on file. Subjective   The following acute and/or chronic problems were also addressed today:    Wellness visit:  The patient has no complaints today. Patient eats 3 meals per day and 1 snacks per day. She does not exercise regularly:   Physical activities include: walking,  She does take over the counter vitamins or supplements. The patient has ever had a blood transfusion or tattooed?: no. She wears seatbelts while riding a car. She does not text or talk on the phone while driving. She performs all of her ADL's without problem. She is independent, she cooks, drives, bathes, and gets dressed without assistance. She is . She has 4 children. She does not work. She is not current on  covid booster, shingrix . She is not a smoker. Patient does not consume alcoholic beverages on a regular basis. Patient has had a colonoscopy. She  reports that she quit smoking about 28 years ago. Her smoking use included cigarettes. She started smoking about 63 years ago. She has a 10.00 pack-year smoking history. She has never used smokeless tobacco. She reports current alcohol use. She reports that she does not use drugs. . Her last mammogram was 2 years, it wasnormal.  She has had a Bone Density. Patient's complete Health Risk Assessment and screening values have been reviewed and are found in Flowsheets.  The following problems were reviewed today and where indicated follow up appointments

## 2023-07-31 NOTE — PROGRESS NOTES
Has a skin lesion on her nose and chest - she would like to see dermatology - hx basal cell carcinoma

## 2023-11-27 DIAGNOSIS — E78.5 HYPERLIPIDEMIA, UNSPECIFIED HYPERLIPIDEMIA TYPE: ICD-10-CM

## 2023-11-27 RX ORDER — ROSUVASTATIN CALCIUM 20 MG/1
20 TABLET, COATED ORAL NIGHTLY
Qty: 90 TABLET | Refills: 3 | Status: SHIPPED | OUTPATIENT
Start: 2023-11-27

## 2023-11-27 NOTE — TELEPHONE ENCOUNTER
----- Message from Brandy Schroeder sent at 11/27/2023  9:56 AM EST -----  Subject: Refill Request    QUESTIONS  Name of Medication? rosuvastatin (CRESTOR) 20 MG tablet  Patient-reported dosage and instructions? 20mg once a day   How many days do you have left? 4  Preferred Pharmacy? [facility] phone number (if available)? [phone]  ---------------------------------------------------------------------------  --------------  CALL BACK INFO  What is the best way for the office to contact you? OK to leave message on   voicemail  Preferred Call Back Phone Number? [phone]  ---------------------------------------------------------------------------  --------------  SCRIPT ANSWERS  Relationship to Patient?  Self

## 2023-11-27 NOTE — TELEPHONE ENCOUNTER
Patient calling regarding: orders    Patient called requesting orders for a Mammogram, Bone density test, and ultrasound.  Patient stated reason being was discussed with PCP, no further information was given .    Please fax orders  Dr. Stout   Fax: [phone]        This medication refill is regarding a telephone request. Refill requested by patient. Requested Prescriptions      No prescriptions requested or ordered in this encounter       Date of last visit: 7/31/2023   Date of next visit: 1/31/2024  Date of last refill:11-10-22 #90/3        Last Lipid Panel:    Lab Results   Component Value Date/Time    CHOL 144 07/26/2023 08:29 AM    TRIG 83 07/26/2023 08:29 AM    HDL 61 07/26/2023 08:29 AM    HDL 58 05/03/2012 12:00 AM    LDLCALC 66 07/26/2023 08:29 AM     Last CMP:   Lab Results   Component Value Date     07/26/2023    K 3.7 07/26/2023     07/26/2023    CO2 28 07/26/2023    BUN 20 07/26/2023    CREATININE 0.9 07/26/2023    GLUCOSE 119 (H) 07/26/2023    CALCIUM 9.8 07/26/2023    PROT 6.5 12/19/2022    LABALBU 4.2 12/19/2022    BILITOT 0.3 12/19/2022    ALKPHOS 76 12/19/2022    AST 18 12/19/2022    ALT 11 12/19/2022    LABGLOM >60 07/26/2023       Rx verified, ordered and set to EP.

## 2024-01-03 DIAGNOSIS — I10 ESSENTIAL HYPERTENSION: ICD-10-CM

## 2024-01-03 RX ORDER — METOPROLOL TARTRATE AND HYDROCHLOROTHIAZIDE 50; 25 MG/1; MG/1
1 TABLET ORAL 2 TIMES DAILY
Qty: 180 TABLET | Refills: 3 | Status: SHIPPED | OUTPATIENT
Start: 2024-01-03

## 2024-01-03 NOTE — TELEPHONE ENCOUNTER
This medication refill is regarding a electronic request. Refill requested by  [facility] .    Requested Prescriptions     Pending Prescriptions Disp Refills    metoprolol-hydroCHLOROthiazide (LOPRESSOR HCT) 50-25 MG per tablet [Pharmacy Med Name: METOPROLOL TAR/HCTZ TABS 50/25MG] 180 tablet 3     Sig: TAKE 1 TABLET TWICE A DAY     Date of last visit: 7/31/2023   Date of next visit: 1/31/2024  Date of last refill: 1/3/23 #180/3    Last CMP:   Lab Results   Component Value Date     07/26/2023    K 3.7 07/26/2023     07/26/2023    CO2 28 07/26/2023    BUN 20 07/26/2023    CREATININE 0.9 07/26/2023    GLUCOSE 119 (H) 07/26/2023    CALCIUM 9.8 07/26/2023    PROT 6.5 12/19/2022    LABALBU 4.2 12/19/2022    BILITOT 0.3 12/19/2022    ALKPHOS 76 12/19/2022    AST 18 12/19/2022    ALT 11 12/19/2022    LABGLOM >60 07/26/2023     Rx verified, ordered and set to EP.

## 2024-01-08 DIAGNOSIS — I10 ESSENTIAL HYPERTENSION: ICD-10-CM

## 2024-01-08 RX ORDER — AMLODIPINE BESYLATE AND BENAZEPRIL HYDROCHLORIDE 5; 20 MG/1; MG/1
2 CAPSULE ORAL DAILY
Qty: 180 CAPSULE | Refills: 3 | Status: SHIPPED | OUTPATIENT
Start: 2024-01-08

## 2024-01-18 ENCOUNTER — TELEPHONE (OUTPATIENT)
Dept: FAMILY MEDICINE CLINIC | Age: 84
End: 2024-01-18

## 2024-01-18 DIAGNOSIS — R73.01 IFG (IMPAIRED FASTING GLUCOSE): Primary | ICD-10-CM

## 2024-01-18 DIAGNOSIS — I10 ESSENTIAL HYPERTENSION: ICD-10-CM

## 2024-01-18 DIAGNOSIS — N18.30 STAGE 3 CHRONIC KIDNEY DISEASE, UNSPECIFIED WHETHER STAGE 3A OR 3B CKD (HCC): ICD-10-CM

## 2024-01-18 NOTE — TELEPHONE ENCOUNTER
Patient notified and labs signed. Patient stated she gets her labs drawn at [facility] on [address] Informed patient that they will already have the lab orders there.

## 2024-01-29 ENCOUNTER — NURSE ONLY (OUTPATIENT)
Dept: LAB | Age: 84
End: 2024-01-29

## 2024-01-29 DIAGNOSIS — N18.30 STAGE 3 CHRONIC KIDNEY DISEASE, UNSPECIFIED WHETHER STAGE 3A OR 3B CKD (HCC): ICD-10-CM

## 2024-01-29 DIAGNOSIS — I10 ESSENTIAL HYPERTENSION: ICD-10-CM

## 2024-01-29 DIAGNOSIS — R73.01 IFG (IMPAIRED FASTING GLUCOSE): ICD-10-CM

## 2024-01-29 LAB
ANION GAP SERPL CALC-SCNC: 11 MEQ/L (ref 8–16)
BUN SERPL-MCNC: 29 MG/DL (ref 7–22)
CALCIUM SERPL-MCNC: 10.2 MG/DL (ref 8.5–10.5)
CHLORIDE SERPL-SCNC: 99 MEQ/L (ref 98–111)
CO2 SERPL-SCNC: 30 MEQ/L (ref 23–33)
CREAT SERPL-MCNC: 1.1 MG/DL (ref 0.4–1.2)
DEPRECATED MEAN GLUCOSE BLD GHB EST-ACNC: 123 MG/DL (ref 70–126)
GFR SERPL CREATININE-BSD FRML MDRD: 50 ML/MIN/1.73M2
GLUCOSE SERPL-MCNC: 109 MG/DL (ref 70–108)
HBA1C MFR BLD HPLC: 6.1 % (ref 4.4–6.4)
POTASSIUM SERPL-SCNC: 3.8 MEQ/L (ref 3.5–5.2)
SODIUM SERPL-SCNC: 140 MEQ/L (ref 135–145)

## 2024-01-31 ENCOUNTER — OFFICE VISIT (OUTPATIENT)
Dept: FAMILY MEDICINE CLINIC | Age: 84
End: 2024-01-31

## 2024-01-31 VITALS
BODY MASS INDEX: 32.63 KG/M2 | RESPIRATION RATE: 16 BRPM | SYSTOLIC BLOOD PRESSURE: 124 MMHG | DIASTOLIC BLOOD PRESSURE: 72 MMHG | HEART RATE: 72 BPM | TEMPERATURE: 98.3 F | WEIGHT: 184.2 LBS

## 2024-01-31 DIAGNOSIS — I10 ESSENTIAL HYPERTENSION: ICD-10-CM

## 2024-01-31 DIAGNOSIS — E78.5 HYPERLIPIDEMIA, UNSPECIFIED HYPERLIPIDEMIA TYPE: ICD-10-CM

## 2024-01-31 DIAGNOSIS — R20.0 ARM NUMBNESS: ICD-10-CM

## 2024-01-31 DIAGNOSIS — R73.01 IFG (IMPAIRED FASTING GLUCOSE): Primary | ICD-10-CM

## 2024-01-31 DIAGNOSIS — N18.30 STAGE 3 CHRONIC KIDNEY DISEASE, UNSPECIFIED WHETHER STAGE 3A OR 3B CKD (HCC): ICD-10-CM

## 2024-01-31 DIAGNOSIS — Z12.31 BREAST CANCER SCREENING BY MAMMOGRAM: ICD-10-CM

## 2024-01-31 DIAGNOSIS — Z78.0 POSTMENOPAUSAL: ICD-10-CM

## 2024-01-31 DIAGNOSIS — I65.21 STENOSIS OF RIGHT CAROTID ARTERY: ICD-10-CM

## 2024-01-31 ASSESSMENT — PATIENT HEALTH QUESTIONNAIRE - PHQ9
SUM OF ALL RESPONSES TO PHQ QUESTIONS 1-9: 0
SUM OF ALL RESPONSES TO PHQ9 QUESTIONS 1 & 2: 0
SUM OF ALL RESPONSES TO PHQ QUESTIONS 1-9: 0
SUM OF ALL RESPONSES TO PHQ QUESTIONS 1-9: 0
2. FEELING DOWN, DEPRESSED OR HOPELESS: 0
1. LITTLE INTEREST OR PLEASURE IN DOING THINGS: 0
SUM OF ALL RESPONSES TO PHQ QUESTIONS 1-9: 0

## 2024-01-31 NOTE — PROGRESS NOTES
[facility]  [address]  [place] [address]  Dept: [phone]  Dept Fax: [phone]    SUBJECTIVE     Chief Complaint   Patient presents with    6 Month Follow-Up     6 month follow up for IFG, HTN, and hyperlipidemia.     Discuss Labs     Discuss labs completed on 1/29/24.       Meagan Maurer is a 83 y.o.female    Pt presents for follow up of IFG, Hypertension, and Hyperlipidemia.    Doing well overall. Arms fall asleep when she sleeps. Interlocks her fingers across her abdomen. Denies neck pain.    The home BP readings are monitored intermittently. Always good at home.     Wt Readings from Last 3 Encounters:   01/31/24 83.6 kg (184 lb 3.2 oz)   07/31/23 82.3 kg (181 lb 6.4 oz)   04/04/23 83.5 kg (184 lb 1.6 oz)        Patient Active Problem List   Diagnosis    Hyperlipidemia    IFG (impaired fasting glucose)    Osteopenia    Essential hypertension    Non morbid obesity due to excess calories    Stenosis of right carotid artery    Stage 3 chronic kidney disease, unspecified whether stage 3a or 3b CKD (HCC)       Current Outpatient Medications   Medication Sig Dispense Refill    amLODIPine-benazepril (LOTREL) 5-20 MG per capsule TAKE 2 CAPSULES DAILY 180 capsule 3    metoprolol-hydroCHLOROthiazide (LOPRESSOR HCT) 50-25 MG per tablet TAKE 1 TABLET TWICE A  tablet 3    rosuvastatin (CRESTOR) 20 MG tablet Take 1 tablet by mouth nightly 90 tablet 3    psyllium (KONSYL) 28.3 % PACK Take 1 packet by mouth daily      calcium-vitamin D (OSCAL-500) 500-200 MG-UNIT per tablet Take 1 tablet by mouth daily      Multiple Vitamins-Minerals (EYE VITAMINS PO) Take by mouth      aspirin 81 MG tablet Take 1 tablet by mouth daily      acetaminophen (TYLENOL) 500 MG tablet Take by mouth 2 times daily 2 tab      Coenzyme Q10 (CO Q 10 PO) Take by mouth Every other day       No current facility-administered medications for this visit.     Review of Systems   Constitutional:  Negative for chills, diaphoresis and

## 2024-02-09 ASSESSMENT — ENCOUNTER SYMPTOMS
CONSTIPATION: 0
DIARRHEA: 0
VOMITING: 0
NAUSEA: 0
BLOOD IN STOOL: 0
SHORTNESS OF BREATH: 0

## 2024-04-04 DIAGNOSIS — I65.21 STENOSIS OF RIGHT CAROTID ARTERY: Primary | ICD-10-CM

## 2024-04-04 DIAGNOSIS — I65.23 BILATERAL CAROTID ARTERY STENOSIS: ICD-10-CM

## 2024-04-10 ENCOUNTER — HOSPITAL ENCOUNTER (OUTPATIENT)
Dept: ULTRASOUND IMAGING | Age: 84
Discharge: HOME | End: 2024-04-10
Attending: THORACIC SURGERY (CARDIOTHORACIC VASCULAR SURGERY)
Payer: MEDICARE

## 2024-04-10 DIAGNOSIS — I65.21 STENOSIS OF RIGHT CAROTID ARTERY: ICD-10-CM

## 2024-04-10 PROCEDURE — 93880 EXTRACRANIAL BILAT STUDY: CPT

## 2024-06-04 ENCOUNTER — OFFICE VISIT (OUTPATIENT)
Age: 84
End: 2024-06-04

## 2024-06-04 VITALS
DIASTOLIC BLOOD PRESSURE: 61 MMHG | BODY MASS INDEX: 32.64 KG/M2 | SYSTOLIC BLOOD PRESSURE: 115 MMHG | WEIGHT: 184.2 LBS | HEIGHT: 63 IN | HEART RATE: 73 BPM

## 2024-06-04 DIAGNOSIS — I65.21 STENOSIS OF RIGHT CAROTID ARTERY: Primary | ICD-10-CM

## 2024-06-04 RX ORDER — OMEGA-3S/DHA/EPA/FISH OIL 1000-1400
2 CAPSULE,DELAYED RELEASE (ENTERIC COATED) ORAL DAILY
COMMUNITY

## 2024-06-04 NOTE — PROGRESS NOTES
MG per tablet, TAKE 1 TABLET TWICE A DAY, Disp: 180 tablet, Rfl: 3    rosuvastatin (CRESTOR) 20 MG tablet, Take 1 tablet by mouth nightly, Disp: 90 tablet, Rfl: 3    calcium-vitamin D (OSCAL-500) 500-200 MG-UNIT per tablet, Take 1 tablet by mouth daily, Disp: , Rfl:     Multiple Vitamins-Minerals (EYE VITAMINS PO), Take by mouth, Disp: , Rfl:     aspirin 81 MG tablet, Take 1 tablet by mouth daily, Disp: , Rfl:     acetaminophen (TYLENOL) 500 MG tablet, Take by mouth 2 times daily 2 tab, Disp: , Rfl:     Coenzyme Q10 (CO Q 10 PO), Take by mouth Every other day, Disp: , Rfl:     Family History:  This patient's family history includes Breast Cancer (age of onset: 46) in her sister; Diabetes in her father; Heart Disease in her father and mother; High Blood Pressure in her father; Stroke in her mother.    Social History:  Meagan  reports that she quit smoking about 29 years ago. Her smoking use included cigarettes. She started smoking about 64 years ago. She has a 10.0 pack-year smoking history. She has never used smokeless tobacco. She reports current alcohol use. She reports that she does not use drugs.    Vital Signs:   /61 (Site: Right Upper Arm, Position: Sitting, Cuff Size: Large Adult)   Pulse 73   Ht 1.6 m (5' 3\")   Wt 83.6 kg (184 lb 3.2 oz)   BMI 32.63 kg/m²     ROS:   Constitutional: Negative for activity change, chills, fatigue, fever and unexpected weight change.   HEENT: Negative for symptoms.  Respiratory: Negative for sleep apnea, shortness of breath, wheezing, stridor, supplementary home oxygen.  Cardiac: Negative for midsternal chest pain, arrhythmia, shortness of breath,  Vascular: Negative for claudication, leg  calf muscle pain, hip pain.  Gastrointestinal: Negative for hematochezia, melana, constipation, and N/V/D.  Musculoskeletal: Negative for myalgias, amputation.  Skin: Negative for color change, rash and wound.   Neurological: Remote right CVA without any sequela.  Nephrology: Negative

## 2024-06-04 NOTE — PATIENT INSTRUCTIONS
If you receive a survey asking about your care experience, please respond. Your answers will help ensure you receive high-quality care at this office. Thank you!    Your Medical Assistant today: Stephany  Thank you for coming to our office! It was a pleasure to serve you.

## 2024-07-24 ENCOUNTER — LAB (OUTPATIENT)
Dept: LAB | Age: 84
End: 2024-07-24

## 2024-07-24 DIAGNOSIS — R73.01 IFG (IMPAIRED FASTING GLUCOSE): ICD-10-CM

## 2024-07-24 DIAGNOSIS — E78.5 HYPERLIPIDEMIA, UNSPECIFIED HYPERLIPIDEMIA TYPE: ICD-10-CM

## 2024-07-24 DIAGNOSIS — I10 ESSENTIAL HYPERTENSION: ICD-10-CM

## 2024-07-24 LAB
ALBUMIN SERPL BCG-MCNC: 4.4 G/DL (ref 3.5–5.1)
ALP SERPL-CCNC: 61 U/L (ref 38–126)
ALT SERPL W/O P-5'-P-CCNC: 12 U/L (ref 11–66)
ANION GAP SERPL CALC-SCNC: 11 MEQ/L (ref 8–16)
AST SERPL-CCNC: 18 U/L (ref 5–40)
BASOPHILS ABSOLUTE: 0 THOU/MM3 (ref 0–0.1)
BASOPHILS NFR BLD AUTO: 0.6 %
BILIRUB SERPL-MCNC: 0.4 MG/DL (ref 0.3–1.2)
BUN SERPL-MCNC: 24 MG/DL (ref 7–22)
CALCIUM SERPL-MCNC: 9.7 MG/DL (ref 8.5–10.5)
CHLORIDE SERPL-SCNC: 101 MEQ/L (ref 98–111)
CHOLEST SERPL-MCNC: 141 MG/DL (ref 100–199)
CO2 SERPL-SCNC: 29 MEQ/L (ref 23–33)
CREAT SERPL-MCNC: 1 MG/DL (ref 0.4–1.2)
DEPRECATED MEAN GLUCOSE BLD GHB EST-ACNC: 120 MG/DL (ref 70–126)
DEPRECATED RDW RBC AUTO: 56.8 FL (ref 35–45)
EOSINOPHIL NFR BLD AUTO: 5 %
EOSINOPHILS ABSOLUTE: 0.4 THOU/MM3 (ref 0–0.4)
ERYTHROCYTE [DISTWIDTH] IN BLOOD BY AUTOMATED COUNT: 17.2 % (ref 11.5–14.5)
GFR SERPL CREATININE-BSD FRML MDRD: 56 ML/MIN/1.73M2
GLUCOSE SERPL-MCNC: 105 MG/DL (ref 70–108)
HBA1C MFR BLD HPLC: 6 % (ref 4.4–6.4)
HCT VFR BLD AUTO: 38.6 % (ref 37–47)
HDLC SERPL-MCNC: 55 MG/DL
HGB BLD-MCNC: 12.7 GM/DL (ref 12–16)
IMM GRANULOCYTES # BLD AUTO: 0.01 THOU/MM3 (ref 0–0.07)
IMM GRANULOCYTES NFR BLD AUTO: 0.1 %
LDLC SERPL CALC-MCNC: 53 MG/DL
LYMPHOCYTES ABSOLUTE: 1.9 THOU/MM3 (ref 1–4.8)
LYMPHOCYTES NFR BLD AUTO: 23.9 %
MCH RBC QN AUTO: 29.9 PG (ref 26–33)
MCHC RBC AUTO-ENTMCNC: 32.9 GM/DL (ref 32.2–35.5)
MCV RBC AUTO: 90.8 FL (ref 81–99)
MONOCYTES ABSOLUTE: 0.7 THOU/MM3 (ref 0.4–1.3)
MONOCYTES NFR BLD AUTO: 8.4 %
NEUTROPHILS ABSOLUTE: 5 THOU/MM3 (ref 1.8–7.7)
NEUTROPHILS NFR BLD AUTO: 62 %
NRBC BLD AUTO-RTO: 0 /100 WBC
PLATELET # BLD AUTO: 207 THOU/MM3 (ref 130–400)
PMV BLD AUTO: 10.7 FL (ref 9.4–12.4)
POTASSIUM SERPL-SCNC: 3.7 MEQ/L (ref 3.5–5.2)
PROT SERPL-MCNC: 7 G/DL (ref 6.1–8)
RBC # BLD AUTO: 4.25 MILL/MM3 (ref 4.2–5.4)
SODIUM SERPL-SCNC: 141 MEQ/L (ref 135–145)
T4 FREE SERPL-MCNC: 1.35 NG/DL (ref 0.93–1.68)
TRIGL SERPL-MCNC: 165 MG/DL (ref 0–199)
TSH SERPL DL<=0.005 MIU/L-ACNC: 2.14 UIU/ML (ref 0.4–4.2)
WBC # BLD AUTO: 8.1 THOU/MM3 (ref 4.8–10.8)

## 2024-08-01 ENCOUNTER — OFFICE VISIT (OUTPATIENT)
Dept: FAMILY MEDICINE CLINIC | Age: 84
End: 2024-08-01

## 2024-08-01 VITALS
HEART RATE: 72 BPM | DIASTOLIC BLOOD PRESSURE: 78 MMHG | SYSTOLIC BLOOD PRESSURE: 130 MMHG | WEIGHT: 183 LBS | RESPIRATION RATE: 14 BRPM | BODY MASS INDEX: 32.42 KG/M2

## 2024-08-01 DIAGNOSIS — K58.0 IRRITABLE BOWEL SYNDROME WITH DIARRHEA: Primary | ICD-10-CM

## 2024-08-01 DIAGNOSIS — I10 ESSENTIAL HYPERTENSION: ICD-10-CM

## 2024-08-01 DIAGNOSIS — E78.5 HYPERLIPIDEMIA, UNSPECIFIED HYPERLIPIDEMIA TYPE: ICD-10-CM

## 2024-08-01 DIAGNOSIS — N18.30 STAGE 3 CHRONIC KIDNEY DISEASE, UNSPECIFIED WHETHER STAGE 3A OR 3B CKD (HCC): ICD-10-CM

## 2024-08-01 DIAGNOSIS — R73.01 IFG (IMPAIRED FASTING GLUCOSE): ICD-10-CM

## 2024-08-01 RX ORDER — ROSUVASTATIN CALCIUM 20 MG/1
20 TABLET, COATED ORAL NIGHTLY
Qty: 90 TABLET | Refills: 3 | Status: SHIPPED | OUTPATIENT
Start: 2024-08-01

## 2024-08-01 RX ORDER — AMLODIPINE BESYLATE AND BENAZEPRIL HYDROCHLORIDE 5; 20 MG/1; MG/1
2 CAPSULE ORAL DAILY
Qty: 180 CAPSULE | Refills: 3 | Status: SHIPPED | OUTPATIENT
Start: 2024-08-01

## 2024-08-01 RX ORDER — METOPROLOL TARTRATE AND HYDROCHLOROTHIAZIDE 50; 25 MG/1; MG/1
1 TABLET ORAL 2 TIMES DAILY
Qty: 180 TABLET | Refills: 3 | Status: SHIPPED | OUTPATIENT
Start: 2024-08-01

## 2024-08-01 RX ORDER — DICYCLOMINE HYDROCHLORIDE 10 MG/1
10 CAPSULE ORAL
Qty: 120 CAPSULE | Refills: 1 | Status: SHIPPED | OUTPATIENT
Start: 2024-08-01

## 2024-08-01 RX ORDER — DICYCLOMINE HYDROCHLORIDE 10 MG/1
10 CAPSULE ORAL
Qty: 120 CAPSULE | Refills: 1 | Status: SHIPPED | OUTPATIENT
Start: 2024-08-01 | End: 2024-08-01

## 2024-08-01 SDOH — ECONOMIC STABILITY: FOOD INSECURITY: WITHIN THE PAST 12 MONTHS, YOU WORRIED THAT YOUR FOOD WOULD RUN OUT BEFORE YOU GOT MONEY TO BUY MORE.: NEVER TRUE

## 2024-08-01 SDOH — ECONOMIC STABILITY: FOOD INSECURITY: WITHIN THE PAST 12 MONTHS, THE FOOD YOU BOUGHT JUST DIDN'T LAST AND YOU DIDN'T HAVE MONEY TO GET MORE.: NEVER TRUE

## 2024-08-01 SDOH — ECONOMIC STABILITY: INCOME INSECURITY: HOW HARD IS IT FOR YOU TO PAY FOR THE VERY BASICS LIKE FOOD, HOUSING, MEDICAL CARE, AND HEATING?: NOT VERY HARD

## 2024-08-01 ASSESSMENT — ENCOUNTER SYMPTOMS
SHORTNESS OF BREATH: 0
VOMITING: 0
BLOOD IN STOOL: 0
CONSTIPATION: 0
DIARRHEA: 1
NAUSEA: 0

## 2024-08-01 NOTE — PROGRESS NOTES
Chief Complaint   Patient presents with    Diarrhea     IBS,          SUBJECTIVE     Meagan Maurer is a 84 y.o.female      Pt complains of IBS with D. This has bothered her for the last 15 years but it is getting worse. She takes fiber regularly and tries to watch her diet closely. Does not get much cramping of the stomach. Sometimes the stools are watery and other times loose. She never has formed stools.     Review of Systems   Constitutional:  Negative for chills, diaphoresis and fever.   Respiratory:  Negative for shortness of breath.    Cardiovascular:  Negative for chest pain, palpitations and leg swelling.   Gastrointestinal:  Positive for diarrhea. Negative for blood in stool, constipation, nausea and vomiting.   Genitourinary:  Negative for dysuria and hematuria.   Musculoskeletal:  Negative for myalgias.   Neurological:  Negative for dizziness and headaches.   All other systems reviewed and are negative.        OBJECTIVE     /78   Pulse 72   Resp 14   Wt 83 kg (183 lb)   BMI 32.42 kg/m²     Physical Exam  Vitals and nursing note reviewed.   Constitutional:       Appearance: She is well-developed.   HENT:      Head: Normocephalic and atraumatic.      Right Ear: External ear normal.      Left Ear: External ear normal.      Nose: Nose normal.   Eyes:      Conjunctiva/sclera: Conjunctivae normal.      Pupils: Pupils are equal, round, and reactive to light.   Cardiovascular:      Rate and Rhythm: Normal rate and regular rhythm.      Heart sounds: Normal heart sounds.   Pulmonary:      Effort: Pulmonary effort is normal.      Breath sounds: Normal breath sounds.   Abdominal:      General: Bowel sounds are normal.      Palpations: Abdomen is soft.   Musculoskeletal:         General: Normal range of motion.      Cervical back: Normal range of motion and neck supple.   Skin:     General: Skin is warm and dry.   Neurological:      Mental Status: She is alert and oriented to person, place, and time.      Deep

## 2024-09-13 DIAGNOSIS — K58.0 IRRITABLE BOWEL SYNDROME WITH DIARRHEA: ICD-10-CM

## 2024-09-16 RX ORDER — DICYCLOMINE HYDROCHLORIDE 10 MG/1
10 CAPSULE ORAL
Qty: 360 CAPSULE | Refills: 3 | Status: SHIPPED | OUTPATIENT
Start: 2024-09-16

## 2025-03-05 ENCOUNTER — LAB (OUTPATIENT)
Dept: LAB | Age: 85
End: 2025-03-05

## 2025-03-05 DIAGNOSIS — R73.01 IFG (IMPAIRED FASTING GLUCOSE): ICD-10-CM

## 2025-03-05 DIAGNOSIS — N18.30 STAGE 3 CHRONIC KIDNEY DISEASE, UNSPECIFIED WHETHER STAGE 3A OR 3B CKD (HCC): ICD-10-CM

## 2025-03-05 LAB
ANION GAP SERPL CALC-SCNC: 11 MEQ/L (ref 8–16)
BUN SERPL-MCNC: 30 MG/DL (ref 8–23)
CALCIUM SERPL-MCNC: 9.8 MG/DL (ref 8.8–10.2)
CHLORIDE SERPL-SCNC: 104 MEQ/L (ref 98–111)
CO2 SERPL-SCNC: 29 MEQ/L (ref 22–29)
CREAT SERPL-MCNC: 1.1 MG/DL (ref 0.5–0.9)
DEPRECATED MEAN GLUCOSE BLD GHB EST-ACNC: 126 MG/DL (ref 70–126)
GFR SERPL CREATININE-BSD FRML MDRD: 50 ML/MIN/1.73M2
GLUCOSE SERPL-MCNC: 108 MG/DL (ref 74–109)
HBA1C MFR BLD HPLC: 6.2 % (ref 4–6)
POTASSIUM SERPL-SCNC: 3.3 MEQ/L (ref 3.5–5.2)
SODIUM SERPL-SCNC: 144 MEQ/L (ref 135–145)

## 2025-03-12 ENCOUNTER — OFFICE VISIT (OUTPATIENT)
Dept: FAMILY MEDICINE CLINIC | Age: 85
End: 2025-03-12

## 2025-03-12 VITALS
WEIGHT: 175 LBS | HEART RATE: 60 BPM | HEIGHT: 63 IN | SYSTOLIC BLOOD PRESSURE: 122 MMHG | DIASTOLIC BLOOD PRESSURE: 68 MMHG | TEMPERATURE: 98 F | BODY MASS INDEX: 31.01 KG/M2 | RESPIRATION RATE: 16 BRPM

## 2025-03-12 DIAGNOSIS — E87.6 HYPOKALEMIA: ICD-10-CM

## 2025-03-12 DIAGNOSIS — K58.0 IRRITABLE BOWEL SYNDROME WITH DIARRHEA: ICD-10-CM

## 2025-03-12 DIAGNOSIS — N18.30 STAGE 3 CHRONIC KIDNEY DISEASE, UNSPECIFIED WHETHER STAGE 3A OR 3B CKD (HCC): ICD-10-CM

## 2025-03-12 DIAGNOSIS — E78.5 HYPERLIPIDEMIA, UNSPECIFIED HYPERLIPIDEMIA TYPE: ICD-10-CM

## 2025-03-12 DIAGNOSIS — I10 ESSENTIAL HYPERTENSION: ICD-10-CM

## 2025-03-12 DIAGNOSIS — R73.01 IFG (IMPAIRED FASTING GLUCOSE): Primary | ICD-10-CM

## 2025-03-12 RX ORDER — MELOXICAM 15 MG/1
TABLET ORAL
COMMUNITY
Start: 2025-03-11

## 2025-03-12 RX ORDER — ROSUVASTATIN CALCIUM 20 MG/1
20 TABLET, COATED ORAL NIGHTLY
Qty: 90 TABLET | Refills: 3 | Status: SHIPPED | OUTPATIENT
Start: 2025-03-12

## 2025-03-12 RX ORDER — AMLODIPINE AND BENAZEPRIL HYDROCHLORIDE 5; 20 MG/1; MG/1
2 CAPSULE ORAL DAILY
Qty: 180 CAPSULE | Refills: 3 | Status: SHIPPED | OUTPATIENT
Start: 2025-03-12

## 2025-03-12 RX ORDER — METOPROLOL TARTRATE AND HYDROCHLOROTHIAZIDE 50; 25 MG/1; MG/1
1 TABLET ORAL 2 TIMES DAILY
Qty: 180 TABLET | Refills: 3 | Status: SHIPPED | OUTPATIENT
Start: 2025-03-12

## 2025-03-12 SDOH — ECONOMIC STABILITY: FOOD INSECURITY: WITHIN THE PAST 12 MONTHS, THE FOOD YOU BOUGHT JUST DIDN'T LAST AND YOU DIDN'T HAVE MONEY TO GET MORE.: NEVER TRUE

## 2025-03-12 SDOH — ECONOMIC STABILITY: FOOD INSECURITY: WITHIN THE PAST 12 MONTHS, YOU WORRIED THAT YOUR FOOD WOULD RUN OUT BEFORE YOU GOT MONEY TO BUY MORE.: NEVER TRUE

## 2025-03-12 ASSESSMENT — PATIENT HEALTH QUESTIONNAIRE - PHQ9
SUM OF ALL RESPONSES TO PHQ QUESTIONS 1-9: 0
SUM OF ALL RESPONSES TO PHQ QUESTIONS 1-9: 0
1. LITTLE INTEREST OR PLEASURE IN DOING THINGS: NOT AT ALL
2. FEELING DOWN, DEPRESSED OR HOPELESS: NOT AT ALL
SUM OF ALL RESPONSES TO PHQ QUESTIONS 1-9: 0
SUM OF ALL RESPONSES TO PHQ QUESTIONS 1-9: 0

## 2025-03-12 ASSESSMENT — ENCOUNTER SYMPTOMS
VOMITING: 0
SHORTNESS OF BREATH: 0
BLOOD IN STOOL: 0
NAUSEA: 0
CONSTIPATION: 0

## 2025-03-12 NOTE — PROGRESS NOTES
[facility]  [address] [facility] [address] [facility] [place] [address]  Dept: [phone]  Dept Fax: [phone]    SUBJECTIVE     Chief Complaint   Patient presents with    Follow-up     6 Month Follow up - Review labs. No concerns at this time       Meagan Maurer is a 84 y.o.female    History of Present Illness  The patient presents for follow up of IFG, Hypertension, and Hyperlipidemia.    She has been experiencing daily episodes of loose stools. She was advised by her granddaughter, a chiropractor, to supplement with magnesium due to her age. However, upon researching, she discovered that magnesium could potentially exacerbate her diarrhea. She is currently on a regimen of Bentyl 10 mg, administered 4 times daily, which she finds beneficial. She has also experimented with doubling the dose of Bentyl while on magnesium and this was beneficial. She expresses concern about potential side effects of Bentyl, including dry mouth and muscle weakness. She has observed a decrease in urinary frequency, despite maintaining her usual water intake. She expresses apprehension about changing her medication due to its current efficacy.    She has a history of knee pain and received an injection yesterday. She was prescribed meloxicam by ortho but has not yet started the medication. She was advised that she could take it concurrently with Tylenol. She reports that the injections have not provided relief for the full duration of 3 months. She expresses a preference for avoiding surgical intervention.    She continues to experience numbness and tingling in her arm, which was previously attributed to positional factors. She is no longer under the care of Dr. Mane and is due for an ultrasound examination. She has an appointment scheduled with ADRIANA Collins.    She has declined further mammograms and bone scans.          Wt Readings from Last 3 Encounters:   03/12/25 79.4 kg (175 lb)   08/01/24 83 kg (183 lb)   06/04/24 83.6 kg (184 lb 3.2 oz)

## 2025-05-13 ENCOUNTER — LAB (OUTPATIENT)
Dept: LAB | Age: 85
End: 2025-05-13

## 2025-05-13 DIAGNOSIS — E87.6 HYPOKALEMIA: ICD-10-CM

## 2025-05-13 LAB
ANION GAP SERPL CALC-SCNC: 12 MEQ/L (ref 8–16)
BUN SERPL-MCNC: 26 MG/DL (ref 8–23)
CALCIUM SERPL-MCNC: 9.9 MG/DL (ref 8.8–10.2)
CHLORIDE SERPL-SCNC: 101 MEQ/L (ref 98–111)
CO2 SERPL-SCNC: 28 MEQ/L (ref 22–29)
CREAT SERPL-MCNC: 1.1 MG/DL (ref 0.5–0.9)
GFR SERPL CREATININE-BSD FRML MDRD: 50 ML/MIN/1.73M2
GLUCOSE SERPL-MCNC: 111 MG/DL (ref 74–109)
POTASSIUM SERPL-SCNC: 3.3 MEQ/L (ref 3.5–5.2)
SODIUM SERPL-SCNC: 141 MEQ/L (ref 135–145)

## 2025-05-14 ENCOUNTER — RESULTS FOLLOW-UP (OUTPATIENT)
Dept: FAMILY MEDICINE CLINIC | Age: 85
End: 2025-05-14

## 2025-05-14 ENCOUNTER — TELEPHONE (OUTPATIENT)
Dept: FAMILY MEDICINE CLINIC | Age: 85
End: 2025-05-14

## 2025-05-14 DIAGNOSIS — E87.6 HYPOKALEMIA: Primary | ICD-10-CM

## 2025-05-14 RX ORDER — POTASSIUM CHLORIDE 750 MG/1
10 TABLET, EXTENDED RELEASE ORAL DAILY
Qty: 30 TABLET | Refills: 0 | Status: SHIPPED | OUTPATIENT
Start: 2025-05-14

## 2025-05-14 NOTE — TELEPHONE ENCOUNTER
Patient notified of results. She would like to start low-dose potassium supplement daily. Patient would like medication sent to [facility], please.     Result Note:   Radha Weathers, APRN - CNP  [facility] Clinical Staff  Renal function is overall stable but her potassium continues to be low at 3.3.  Would she like to try increasing her potassium intake, or would she like to go on a low-dose of potassium daily?  If she would like to try dietary options, lets recheck potassium in 1 week.  Thanks, TS

## 2025-05-20 ENCOUNTER — LAB (OUTPATIENT)
Dept: LAB | Age: 85
End: 2025-05-20

## 2025-05-20 DIAGNOSIS — E87.6 HYPOKALEMIA: ICD-10-CM

## 2025-05-20 LAB — POTASSIUM SERPL-SCNC: 3.9 MEQ/L (ref 3.5–5.2)

## 2025-05-21 ENCOUNTER — RESULTS FOLLOW-UP (OUTPATIENT)
Dept: FAMILY MEDICINE CLINIC | Age: 85
End: 2025-05-21

## 2025-05-21 RX ORDER — POTASSIUM CHLORIDE 750 MG/1
10 TABLET, EXTENDED RELEASE ORAL DAILY
Qty: 90 TABLET | Refills: 3 | Status: SHIPPED | OUTPATIENT
Start: 2025-05-21

## 2025-06-13 ENCOUNTER — TELEPHONE (OUTPATIENT)
Age: 85
End: 2025-06-13

## 2025-06-13 DIAGNOSIS — I65.21 STENOSIS OF RIGHT CAROTID ARTERY: Primary | ICD-10-CM

## 2025-06-18 ENCOUNTER — HOSPITAL ENCOUNTER (OUTPATIENT)
Dept: INTERVENTIONAL RADIOLOGY/VASCULAR | Age: 85
Discharge: HOME | End: 2025-06-20
Payer: MEDICARE

## 2025-06-18 DIAGNOSIS — I65.21 STENOSIS OF RIGHT CAROTID ARTERY: ICD-10-CM

## 2025-06-18 PROCEDURE — 93880 EXTRACRANIAL BILAT STUDY: CPT

## 2025-06-25 ENCOUNTER — OFFICE VISIT (OUTPATIENT)
Age: 85
End: 2025-06-25
Payer: MEDICARE

## 2025-06-25 VITALS
HEIGHT: 63 IN | SYSTOLIC BLOOD PRESSURE: 129 MMHG | HEART RATE: 54 BPM | DIASTOLIC BLOOD PRESSURE: 58 MMHG | WEIGHT: 172 LBS | BODY MASS INDEX: 30.48 KG/M2

## 2025-06-25 DIAGNOSIS — I65.21 STENOSIS OF RIGHT CAROTID ARTERY: Primary | ICD-10-CM

## 2025-06-25 PROCEDURE — 1159F MED LIST DOCD IN RCRD: CPT | Performed by: PHYSICIAN ASSISTANT

## 2025-06-25 PROCEDURE — 3074F SYST BP LT 130 MM HG: CPT | Performed by: PHYSICIAN ASSISTANT

## 2025-06-25 PROCEDURE — 3078F DIAST BP <80 MM HG: CPT | Performed by: PHYSICIAN ASSISTANT

## 2025-06-25 PROCEDURE — 1123F ACP DISCUSS/DSCN MKR DOCD: CPT | Performed by: PHYSICIAN ASSISTANT

## 2025-06-25 PROCEDURE — 1160F RVW MEDS BY RX/DR IN RCRD: CPT | Performed by: PHYSICIAN ASSISTANT

## 2025-06-25 PROCEDURE — 99214 OFFICE O/P EST MOD 30 MIN: CPT | Performed by: PHYSICIAN ASSISTANT

## 2025-06-25 NOTE — PROGRESS NOTES
CT/CV Surgery Follow Up Office Visit      Patient's Name/Date of Birth: Meagan Maurer / 1940 (85 y.o.)    PCP: Radha Weathers APRN - CNP    Date: June 25, 2025    We had the pleasure of seeing Meagan Maurer in the office today, as you know this is a very pleasant 85 y.o. year old female with a history of CVA, hypertension and hyperlipidemia.  She is S/p Right CEA by Dr. Mosley in 2017.  She returned to the cardiovascular surgery clinic for follow-up.    She denies any recent episode of syncope, temporary blindness, hemiparesis, slurred speech, or facial droop.    Imaging Studies:  Duplex Bilat Carotid:6/18/25  PROCEDURE: VAS DUP CAROTID BILATERAL     CLINICAL INFORMATION: CAROTID STENOSIS     COMPARISON: 4/10/2024     TECHNIQUE: Multiple grayscale and color flow images of both carotid systems and  both vertebral arteries were obtained. Spectral Doppler waveforms were generated  and velocity measurements were obtained.     RIGHT                                 PSV/EDV     DIST CCA-------->78/15cm/s  PROX ICA-------->129/23cm/s  ECA---------------->89/0cm/s      VERT-------------->39/9cm/s        LEFT                                 PSV/EDV     DIST CCA-------->74/16cm/s  PROX ICA-------->107/29cm/s  ECA---------------->80/0cm/s      VERT-------------->49/14cm/s           IMPRESSION:     1. RIGHT  ICA ... Mild soft plaque, mild stenosis, less than 50%...  ECA.. Unremarkable   CCA.  Unremarkable ..  VERT Antegrade flow...     2. LEFT  ICA..... Moderate calcific plaque, less than 50% stenosis. No significant change  from prior study...  ECA... Mild mixed plaque, mild stenosis.  CCA... Unremarkable.  VERT. Antegrade flow.        **This report has been created using voice recognition software.  It may contain  minor errors which are inherent in voice recognition technology.**     Electronically signed by Dr. Jesus Chanel    Duplex Bilat Carotid:4/10/24  PROCEDURE: VAS DUP CAROTID BILATERAL     CLINICAL

## 2025-06-25 NOTE — PATIENT INSTRUCTIONS
If you receive a survey asking about your care experience, please respond. Your answers will help ensure you receive high-quality care at this office. Thank you!    Your Medical Assistant today: Amairani ARMIJO  Thank you for coming to our office! It was a pleasure to serve you.